# Patient Record
Sex: FEMALE | URBAN - METROPOLITAN AREA
[De-identification: names, ages, dates, MRNs, and addresses within clinical notes are randomized per-mention and may not be internally consistent; named-entity substitution may affect disease eponyms.]

---

## 2024-09-24 ENCOUNTER — E-VISIT (OUTPATIENT)
Dept: ADMINISTRATIVE | Facility: OTHER | Age: 44
End: 2024-09-24

## 2024-10-22 ENCOUNTER — E-VISIT (OUTPATIENT)
Dept: FAMILY MEDICINE CLINIC | Facility: TELEHEALTH | Age: 44
End: 2024-10-22
Payer: COMMERCIAL

## 2024-10-22 NOTE — E-VISIT TREATED
Date: 10/22/2024 19:42:55  Clinician: Veronica Sharp  Clinician NPI: [id number]  Patient: Siomara Duffy  Patient : 1980  Patient Address: [facility] [place] [address]  Patient Phone: [phone]  Visit Protocol: Ear pain  Patient Summary:  Siomara is a 44 year old ( : 1980 ) female who initiated a visit for swimmer's ear (outer ear infection).     Siomara reports that the ear pain started more than 7 days ago. The ear pain is located inside the left ear.   In   addition to the ear pain, Siomara is experiencing tenderness and a feeling of fullness in the ear(s). The ear(s) is tender to the touch on the ear canal.   Symptom Details   Pain: Siomara is experiencing moderate pain (4-6 on a 10 point pain scale). It   gets worse when Siomara gently pulls on the earlobe(s). It does not get worse when Siomara eats or chews.    Siomara denies ringing, change in color of the affected area, changes in hearing, itchiness, and blistering in the ear(s). Siomara also denies   recent injuries near the ear(s), the possibility of a foreign object in the ear(s), feeling feverish, having fluid draining from the ear(s), and ever having ear tubes.   Precipitating events   Siomara denies flying and swimming within the past week.     Pertinent medical history   Siomara denies having immunosuppressive conditions (e.g., chemotherapy, HIV, organ transplant, long-term use of steroids or other immunosuppressive medications, splenectomy).   Siomara does not have diabetes.   Siomara denies   pregnancy and denies breastfeeding.   Weight: 145 lbs (65.77 kg)    MEDICATIONS: Vitamin B-12 oral, Estroven Complete Menopause Relief oral, ALLERGIES: sulfamethoxazole  Clinician Response:  Dear Siomara,   Based upon the information you provided, you may have otitis externa. External otitis, also known as swimmer's ear, is a condition that occurs when the ear canal becomes irritated or infected.   Medication information    I  am prescribing:     Neomycin-polymyxin-hydrocortisone 1% otic ear drops. Instill 4 drops into affected ear(s) 3 times per day for 7 days. There are no refills with this prescription.   Instructions for using eardrops:     Lie on your side or tilt your head    Insert the drops into your ear canal    Lie on your side or insert a cotton ball in the ear canal for 5 minutes    Use the medication for the number of days recommended, even if you begin to feel better within a few days after starting the drops     Self care  Avoid getting water inside your ear while you are being treated for swimmer's ear.  Use a cotton ball coated with petroleum jelly to protect your ears when bathing and showering.  Do not go swimming or diving for the next 7-10 days.  Do not   wear headphones or hearing aid in the infected ears until your symptoms improve.  When to seek care  Please make an appointment to be seen in a clinic or urgent care if any of the following occur:     Symptoms do not improve within 2 days    New symptoms develop or symptoms becomes worse      Diagnosis: Otitis externa  Diagnosis ICD: H60.399    Follow up instructions: ATTENTION: If you have been prescribed medications, your prescriptions will not be sent until you choose your pharmacy.  To do so open the link within your notification, or go to [employer] and click eVisit in the menu to open your   treatment plan. From there, you can select your pharmacy at the bottom of your after visit summary. You can also go to [email or web address]  Prescriptions  Prescription: neomycin-polymyxin-HC 3.5-10,000-1 mg/mL-unit/mL-% otic (ear) drops,suspension, instill 4 drops into affected ear(s) 3 times per day for 7 days  Sent To: [id number] - [phone] - [address] [place] [address]

## 2024-11-11 ENCOUNTER — OFFICE VISIT (OUTPATIENT)
Dept: INTERNAL MEDICINE | Facility: CLINIC | Age: 44
End: 2024-11-11
Payer: COMMERCIAL

## 2024-11-11 ENCOUNTER — LAB (OUTPATIENT)
Dept: LAB | Facility: HOSPITAL | Age: 44
End: 2024-11-11
Payer: COMMERCIAL

## 2024-11-11 VITALS
OXYGEN SATURATION: 100 % | HEIGHT: 64 IN | BODY MASS INDEX: 28.92 KG/M2 | SYSTOLIC BLOOD PRESSURE: 118 MMHG | HEART RATE: 107 BPM | DIASTOLIC BLOOD PRESSURE: 68 MMHG | WEIGHT: 169.4 LBS

## 2024-11-11 DIAGNOSIS — F33.1 MODERATE EPISODE OF RECURRENT MAJOR DEPRESSIVE DISORDER: ICD-10-CM

## 2024-11-11 DIAGNOSIS — N94.6 PAINFUL MENSTRUATION: ICD-10-CM

## 2024-11-11 DIAGNOSIS — Z83.3 FAMILY HISTORY OF DIABETES MELLITUS (DM): ICD-10-CM

## 2024-11-11 DIAGNOSIS — Z76.89 ENCOUNTER TO ESTABLISH CARE: Primary | ICD-10-CM

## 2024-11-11 DIAGNOSIS — Z13.29 SCREENING FOR HYPOTHYROIDISM: ICD-10-CM

## 2024-11-11 DIAGNOSIS — R10.9 ABDOMINAL CRAMPING: ICD-10-CM

## 2024-11-11 DIAGNOSIS — Z13.0 SCREENING FOR IRON DEFICIENCY ANEMIA: ICD-10-CM

## 2024-11-11 DIAGNOSIS — Z13.21 ENCOUNTER FOR VITAMIN DEFICIENCY SCREENING: ICD-10-CM

## 2024-11-11 DIAGNOSIS — N92.1 MENORRHAGIA WITH IRREGULAR CYCLE: ICD-10-CM

## 2024-11-11 DIAGNOSIS — Z13.220 SCREENING FOR HYPERLIPIDEMIA: ICD-10-CM

## 2024-11-11 DIAGNOSIS — N92.6 MENSTRUAL ABNORMALITY: ICD-10-CM

## 2024-11-11 DIAGNOSIS — Z13.1 SCREENING FOR DIABETES MELLITUS (DM): ICD-10-CM

## 2024-11-11 DIAGNOSIS — Z01.419 WELL WOMAN EXAM: ICD-10-CM

## 2024-11-11 LAB
ALBUMIN SERPL-MCNC: 4.1 G/DL (ref 3.5–5.2)
ALBUMIN/GLOB SERPL: 1.6 G/DL
ALP SERPL-CCNC: 77 U/L (ref 39–117)
ALT SERPL W P-5'-P-CCNC: 7 U/L (ref 1–33)
ANION GAP SERPL CALCULATED.3IONS-SCNC: 10 MMOL/L (ref 5–15)
AST SERPL-CCNC: 9 U/L (ref 1–32)
BILIRUB BLD-MCNC: NEGATIVE MG/DL
BILIRUB SERPL-MCNC: 0.2 MG/DL (ref 0–1.2)
BUN SERPL-MCNC: 13 MG/DL (ref 6–20)
BUN/CREAT SERPL: 21.7 (ref 7–25)
CALCIUM SPEC-SCNC: 9.2 MG/DL (ref 8.6–10.5)
CHLORIDE SERPL-SCNC: 107 MMOL/L (ref 98–107)
CHOLEST SERPL-MCNC: 121 MG/DL (ref 0–200)
CLARITY, POC: ABNORMAL
CO2 SERPL-SCNC: 22 MMOL/L (ref 22–29)
COLOR UR: ABNORMAL
CREAT SERPL-MCNC: 0.6 MG/DL (ref 0.57–1)
DEPRECATED RDW RBC AUTO: 43.5 FL (ref 37–54)
EGFRCR SERPLBLD CKD-EPI 2021: 113.7 ML/MIN/1.73
ERYTHROCYTE [DISTWIDTH] IN BLOOD BY AUTOMATED COUNT: 20.2 % (ref 12.3–15.4)
EXPIRATION DATE: ABNORMAL
FERRITIN SERPL-MCNC: 2.63 NG/ML (ref 13–150)
GLOBULIN UR ELPH-MCNC: 2.6 GM/DL
GLUCOSE SERPL-MCNC: 85 MG/DL (ref 65–99)
GLUCOSE UR STRIP-MCNC: NEGATIVE MG/DL
HCT VFR BLD AUTO: 18.3 % (ref 34–46.6)
HDLC SERPL-MCNC: 41 MG/DL (ref 40–60)
HGB BLD-MCNC: 4.5 G/DL (ref 12–15.9)
IRON 24H UR-MRATE: 10 MCG/DL (ref 37–145)
IRON SATN MFR SERPL: 2 % (ref 20–50)
KETONES UR QL: NEGATIVE
LDLC SERPL CALC-MCNC: 61 MG/DL (ref 0–100)
LDLC/HDLC SERPL: 1.45 {RATIO}
LEUKOCYTE EST, POC: NEGATIVE
Lab: ABNORMAL
MCH RBC QN AUTO: 15.7 PG (ref 26.6–33)
MCHC RBC AUTO-ENTMCNC: 24.6 G/DL (ref 31.5–35.7)
MCV RBC AUTO: 63.8 FL (ref 79–97)
NITRITE UR-MCNC: NEGATIVE MG/ML
PH UR: 6 [PH] (ref 5–8)
PLATELET # BLD AUTO: 293 10*3/MM3 (ref 140–450)
POTASSIUM SERPL-SCNC: 4.4 MMOL/L (ref 3.5–5.2)
PROT SERPL-MCNC: 6.7 G/DL (ref 6–8.5)
PROT UR STRIP-MCNC: NEGATIVE MG/DL
RBC # BLD AUTO: 2.87 10*6/MM3 (ref 3.77–5.28)
RBC # UR STRIP: ABNORMAL /UL
SODIUM SERPL-SCNC: 139 MMOL/L (ref 136–145)
SP GR UR: 1.01 (ref 1–1.03)
TIBC SERPL-MCNC: 483 MCG/DL (ref 298–536)
TRANSFERRIN SERPL-MCNC: 324 MG/DL (ref 200–360)
TRIGL SERPL-MCNC: 103 MG/DL (ref 0–150)
TSH SERPL DL<=0.05 MIU/L-ACNC: 1.58 UIU/ML (ref 0.27–4.2)
UROBILINOGEN UR QL: ABNORMAL
VLDLC SERPL-MCNC: 19 MG/DL (ref 5–40)
WBC NRBC COR # BLD AUTO: 5.87 10*3/MM3 (ref 3.4–10.8)

## 2024-11-11 PROCEDURE — 80061 LIPID PANEL: CPT | Performed by: NURSE PRACTITIONER

## 2024-11-11 PROCEDURE — 80053 COMPREHEN METABOLIC PANEL: CPT | Performed by: NURSE PRACTITIONER

## 2024-11-11 PROCEDURE — 82306 VITAMIN D 25 HYDROXY: CPT | Performed by: NURSE PRACTITIONER

## 2024-11-11 PROCEDURE — 85027 COMPLETE CBC AUTOMATED: CPT | Performed by: NURSE PRACTITIONER

## 2024-11-11 PROCEDURE — 83036 HEMOGLOBIN GLYCOSYLATED A1C: CPT | Performed by: NURSE PRACTITIONER

## 2024-11-11 PROCEDURE — 83540 ASSAY OF IRON: CPT | Performed by: NURSE PRACTITIONER

## 2024-11-11 PROCEDURE — 82607 VITAMIN B-12: CPT | Performed by: NURSE PRACTITIONER

## 2024-11-11 PROCEDURE — 84466 ASSAY OF TRANSFERRIN: CPT | Performed by: NURSE PRACTITIONER

## 2024-11-11 PROCEDURE — 99214 OFFICE O/P EST MOD 30 MIN: CPT | Performed by: NURSE PRACTITIONER

## 2024-11-11 PROCEDURE — 82728 ASSAY OF FERRITIN: CPT | Performed by: NURSE PRACTITIONER

## 2024-11-11 PROCEDURE — 36415 COLL VENOUS BLD VENIPUNCTURE: CPT | Performed by: NURSE PRACTITIONER

## 2024-11-11 PROCEDURE — 84443 ASSAY THYROID STIM HORMONE: CPT | Performed by: NURSE PRACTITIONER

## 2024-11-11 PROCEDURE — 81003 URINALYSIS AUTO W/O SCOPE: CPT | Performed by: NURSE PRACTITIONER

## 2024-11-11 RX ORDER — LANOLIN ALCOHOL/MO/W.PET/CERES
2500 CREAM (GRAM) TOPICAL DAILY
COMMUNITY

## 2024-11-11 NOTE — PROGRESS NOTES
Office Note     Name: Siomara Duffy    : 1980     MRN: [id number]     Chief Complaint  Establish Care, Labs Only (Patient reports today to establish care and get labs. Patient states she thinks she may be anemic. ), Menopause (Patient reports today because she thinks she is going into menopause. Patient states her mood swings are all over the place, she has excessive bleeding and has severe abdominal cramps. Patient states she would like to discuss where she is at stage wise during menopause. Patent states she has had increased symptoms for around 1 year and her period hasn't stopped in over 20 days. ), and Dizziness (Patient reports today to discuss dizziness. Patient states that she has had dizziness going on for around 6 months and thinks it may be caused because on menopause and anemia from bleeding so much consistently. )    Subjective     History of Present Illness:  Siomara Duffy is a 44 y.o. female who presents today to establish care with a new provider.  Patient has not had a primary care provider since prior to COVID.  Past medical and medication history reviewed with the patient.    Family history consist of maternal grandmother with rheumatoid arthritis and paternal grandmother with diabetes.    Patient reports she has been dealing with emotional and hormonal changes since the start of  when she turned 40.  She is concerned that she may be dealing with some menopausal symptoms.  She has noticed that her menstrual cycle is more regular recently.  Her cycle is occurring more frequently with a much heavier flow.  She reports during her menstrual cycle she has to be homebound for about 4 days of the cycle due to excessive and heavy bleeding.  She will have to change her pads hourly.  She is also noted passing multiple blood clots.  She has been experiencing some abdominal pain which she feels like has been worse over the past month the pain is present to her low abdomen bilaterally and  suprapubic area.  She is also noted a vaginal discharge.      She is normally able to take ibuprofen and Pamprin as needed to assist with symptoms.  She does not feel this has been helpful recently.  She has also noted some swelling in her abdomen.  She reports the pain is severe at times and causes nausea.  She has also noted some occasional ankle edema.  She has been working on drinking more water recently and eating less sugar.  She feels her ankle edema has been improved with doing so.      She previously saw a gynecologist back in 2018 or 2019.  She was experiencing the abdominal pain at that time.  She tried to discuss with her provider at that time but did not feel heard by that provider.  She never returned for follow-up.  Her Pap smear at that time was normal.  She denies a history of abnormal Pap smear.  She has no known ovarian cyst or uterine fibroids.  She is concerned she may have endometriosis.  She does feel the emotional and hormonal shifts have been worse recently.  She has been taking an over-the-counter Estroven daily supplement.  She would like to have labs done and discuss further evaluation of the symptoms.    She has a history of depression.  She does feel her depression symptoms have been worsening with her menopausal symptoms.  She does not currently take any medication for her depression.  She would like to have labs checked first prior to discussing medication.    She is  and has been with her significant other for 22 years.  She has 1 daughter.  She works from home.  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    She denies any tobacco use.  She denies any recreational drug use.  She does have about 2 glasses of wine weekly.    No further complaints or concerns at this time.  Patient presents today to establish care with a new provider and to address the above concerns.  Pleasant visit with the patient today.        History reviewed. No pertinent past medical  "history.    History reviewed. No pertinent surgical history.    Social History     Socioeconomic History    Marital status:    Tobacco Use    Smoking status: Never    Smokeless tobacco: Never   Vaping Use    Vaping status: Never Used   Substance and Sexual Activity    Alcohol use: Yes     Alcohol/week: 2.0 standard drinks of alcohol     Types: 2 Glasses of wine per week    Drug use: Never    Sexual activity: Not Currently         Current Outpatient Medications:     vitamin B-12 (CYANOCOBALAMIN) 1000 MCG tablet, Take 2.5 tablets by mouth Daily., Disp: , Rfl:     Objective     Vital Signs  /68 (BP Location: Left arm, Patient Position: Sitting, Cuff Size: Adult)   Pulse 107   Ht 162.6 cm (64\")   Wt 76.8 kg (169 lb 6.4 oz)   SpO2 100%   BMI 29.08 kg/m²   Estimated body mass index is 29.08 kg/m² as calculated from the following:    Height as of this encounter: 162.6 cm (64\").    Weight as of this encounter: 76.8 kg (169 lb 6.4 oz).    BMI is >= 25 and <30. (Overweight) The following options were offered after discussion;: Not addressed at this visit.      Physical Exam  Constitutional:       General: She is not in acute distress.     Appearance: Normal appearance. She is not ill-appearing.   HENT:      Head: Normocephalic and atraumatic.      Nose: Nose normal.   Eyes:      Extraocular Movements: Extraocular movements intact.      Conjunctiva/sclera: Conjunctivae normal.      Pupils: Pupils are equal, round, and reactive to light.      Comments: Glasses in place   Cardiovascular:      Rate and Rhythm: Normal rate.   Pulmonary:      Effort: Pulmonary effort is normal. No respiratory distress.   Musculoskeletal:         General: Normal range of motion.      Cervical back: Neck supple.   Skin:     General: Skin is warm and dry.      Coloration: Skin is pale.   Neurological:      General: No focal deficit present.      Mental Status: She is alert and oriented to person, place, and time. Mental status is at " baseline.   Psychiatric:         Mood and Affect: Mood normal.         Behavior: Behavior normal.         Thought Content: Thought content normal.         Judgment: Judgment normal.          Assessment and Plan     Diagnoses and all orders for this visit:    1. Encounter to establish care (Primary)    2. Moderate episode of recurrent major depressive disorder    3. Menstrual abnormality  -     Ambulatory Referral to Gynecology  -     POCT urinalysis dipstick, automated    4. Family history of diabetes mellitus (DM)  -     Comprehensive metabolic panel  -     Hemoglobin A1c    5. Screening for hypothyroidism  -     TSH Rfx On Abnormal To Free T4    6. Screening for diabetes mellitus (DM)  -     Comprehensive metabolic panel  -     Urinalysis With Culture If Indicated -  -     Hemoglobin A1c    7. Encounter for vitamin deficiency screening  -     Vitamin D,25-Hydroxy  -     Vitamin B12    8. Screening for iron deficiency anemia  -     CBC (No Diff)  -     Iron Profile  -     Ferritin    9. Screening for hyperlipidemia  -     Lipid Panel    10. Menorrhagia with irregular cycle  -     CBC (No Diff)  -     Iron Profile  -     Ferritin  -     Ambulatory Referral to Gynecology  -     POCT urinalysis dipstick, automated    11. Painful menstruation  -     Ambulatory Referral to Gynecology  -     POCT urinalysis dipstick, automated    12. Abdominal cramping  -     POCT urinalysis dipstick, automated    13. Well woman exam  -     Ambulatory Referral to Gynecology    Plan:  Establish care visit completed today.  Orders placed for labs.  Concern for significant anemia as patient has had ongoing heavy menses and abnormal menstrual cycles for the past 4 years.  Will also check iron levels.  Will review lab results with patient once received and reviewed.  Further plan of care based on lab result findings.  Urgent referral placed to gynecology for evaluation of symptoms.  UA in the office today with 2+ blood.  Patient is on her  menstrual cycle.  Otherwise, ELDA fraire.  Will await lab results.  Plan for follow-up in about 4 weeks for annual physical exam.  Return to clinic sooner if needed.      Follow Up  Return for Annual.    ELDER Martinez    Part of this note may be an electronic transcription/translation of spoken language to printed text using the Dragon Dictation System.

## 2024-11-12 ENCOUNTER — TELEPHONE (OUTPATIENT)
Dept: INTERNAL MEDICINE | Facility: CLINIC | Age: 44
End: 2024-11-12
Payer: COMMERCIAL

## 2024-11-12 LAB
25(OH)D3 SERPL-MCNC: 22.6 NG/ML (ref 30–100)
HBA1C MFR BLD: 7.8 % (ref 4.8–5.6)
VIT B12 BLD-MCNC: >2000 PG/ML (ref 211–946)

## 2024-11-12 NOTE — TELEPHONE ENCOUNTER
Pt notified that she needs to go to ER per Dr Day and patient declines to go to ER for eval. She does not feel comfortable with this. She states that she is only like this because her period has been heavy and ongoing and her bleeding is not as bad today so she is starting to feel better. She states that she has had heart palps and dizziness but she is not having those today. She wants to see if there is something she can try at home to get her hemoglobin up and she will do that. Advised that I will pass this message to Ivis and Dr. Day and get back to her if they have other recommendations.

## 2024-11-12 NOTE — TELEPHONE ENCOUNTER
Message left for patient to call office to follow up on critical lab results.  Per Dr. Day patient needs to be advised to go to ED for further evaluation.

## 2024-11-12 NOTE — TELEPHONE ENCOUNTER
Left Vm at [phone] and [phone] because both of those numbers are listed as hers and one of them could be a typo. Pt has a critical lab that needs to be addressed. I also left a VM with her daughter who is listed as alternate .

## 2024-11-12 NOTE — TELEPHONE ENCOUNTER
Called and left VM for pt to return call to office. Also called and left VM for daughter to return call to office.

## 2024-11-15 ENCOUNTER — PATIENT ROUNDING (BHMG ONLY) (OUTPATIENT)
Dept: INTERNAL MEDICINE | Facility: CLINIC | Age: 44
End: 2024-11-15
Payer: COMMERCIAL

## 2024-11-15 NOTE — PROGRESS NOTES
A My-chart message has been sent to the patient for Patient Rounding with [facility].

## 2024-11-27 DIAGNOSIS — N94.6 DYSMENORRHEA: ICD-10-CM

## 2024-11-27 DIAGNOSIS — N92.1 MENORRHAGIA WITH IRREGULAR CYCLE: Primary | ICD-10-CM

## 2024-12-02 ENCOUNTER — OFFICE VISIT (OUTPATIENT)
Dept: OBSTETRICS AND GYNECOLOGY | Facility: CLINIC | Age: 44
End: 2024-12-02
Payer: COMMERCIAL

## 2024-12-02 VITALS — BODY MASS INDEX: 39.34 KG/M2 | HEIGHT: 55 IN | WEIGHT: 170 LBS

## 2024-12-02 DIAGNOSIS — N93.9 ABNORMAL UTERINE BLEEDING (AUB): Primary | ICD-10-CM

## 2024-12-02 DIAGNOSIS — Z01.419 ROUTINE GYNECOLOGICAL EXAMINATION: ICD-10-CM

## 2024-12-02 DIAGNOSIS — Z12.11 COLON CANCER SCREENING: ICD-10-CM

## 2024-12-02 DIAGNOSIS — D50.0 IRON DEFICIENCY ANEMIA DUE TO CHRONIC BLOOD LOSS: ICD-10-CM

## 2024-12-02 DIAGNOSIS — N94.89 ADNEXAL MASS: ICD-10-CM

## 2024-12-02 DIAGNOSIS — Z12.31 ENCOUNTER FOR SCREENING MAMMOGRAM FOR MALIGNANT NEOPLASM OF BREAST: ICD-10-CM

## 2024-12-02 LAB
ALBUMIN SERPL-MCNC: 4.2 G/DL (ref 3.5–5.2)
ALBUMIN/GLOB SERPL: 1.4 G/DL
ALP SERPL-CCNC: 82 U/L (ref 39–117)
ALT SERPL-CCNC: 6 U/L (ref 1–33)
AST SERPL-CCNC: 12 U/L (ref 1–32)
B-HCG UR QL: NEGATIVE
BASOPHILS # BLD AUTO: ABNORMAL 10*3/UL
BASOPHILS # BLD MANUAL: 0.1 10*3/MM3 (ref 0–0.2)
BASOPHILS NFR BLD MANUAL: 2.2 % (ref 0–1.5)
BILIRUB SERPL-MCNC: 0.3 MG/DL (ref 0–1.2)
BUN SERPL-MCNC: 11 MG/DL (ref 6–20)
BUN/CREAT SERPL: 16.7 (ref 7–25)
CALCIUM SERPL-MCNC: 9.5 MG/DL (ref 8.6–10.5)
CHLORIDE SERPL-SCNC: 104 MMOL/L (ref 98–107)
CO2 SERPL-SCNC: 20.9 MMOL/L (ref 22–29)
CREAT SERPL-MCNC: 0.66 MG/DL (ref 0.57–1)
DIFFERENTIAL COMMENT: ABNORMAL
EGFRCR SERPLBLD CKD-EPI 2021: 111.1 ML/MIN/1.73
EOSINOPHIL # BLD AUTO: ABNORMAL 10*3/UL
EOSINOPHIL NFR BLD AUTO: ABNORMAL %
ERYTHROCYTE [DISTWIDTH] IN BLOOD BY AUTOMATED COUNT: 20.1 % (ref 12.3–15.4)
EXPIRATION DATE: NORMAL
GLOBULIN SER CALC-MCNC: 2.9 GM/DL
GLUCOSE SERPL-MCNC: 97 MG/DL (ref 65–99)
HCT VFR BLD AUTO: 19.1 % (ref 34–46.6)
HGB BLD-MCNC: 4.7 G/DL (ref 12–15.9)
INTERNAL NEGATIVE CONTROL: NORMAL
INTERNAL POSITIVE CONTROL: NORMAL
IRON SATN MFR SERPL: 3 % (ref 20–50)
IRON SERPL-MCNC: 16 MCG/DL (ref 37–145)
LYMPHOCYTES # BLD AUTO: ABNORMAL 10*3/UL
LYMPHOCYTES # BLD MANUAL: 1.34 10*3/MM3 (ref 0.7–3.1)
LYMPHOCYTES NFR BLD AUTO: ABNORMAL %
LYMPHOCYTES NFR BLD MANUAL: 30.4 % (ref 19.6–45.3)
Lab: NORMAL
MCH RBC QN AUTO: 15.5 PG (ref 26.6–33)
MCHC RBC AUTO-ENTMCNC: 24.6 G/DL (ref 31.5–35.7)
MCV RBC AUTO: 62.8 FL (ref 79–97)
MONOCYTES # BLD MANUAL: 0.33 10*3/MM3 (ref 0.1–0.9)
MONOCYTES NFR BLD AUTO: ABNORMAL %
MONOCYTES NFR BLD MANUAL: 7.6 % (ref 5–12)
NEUTROPHILS # BLD MANUAL: 2.63 10*3/MM3 (ref 1.7–7)
NEUTROPHILS NFR BLD AUTO: ABNORMAL %
NEUTROPHILS NFR BLD MANUAL: 59.8 % (ref 42.7–76)
PLATELET # BLD AUTO: 326 10*3/MM3 (ref 140–450)
PLATELET BLD QL SMEAR: ABNORMAL
POTASSIUM SERPL-SCNC: 4.9 MMOL/L (ref 3.5–5.2)
PROT SERPL-MCNC: 7.1 G/DL (ref 6–8.5)
RBC # BLD AUTO: 3.04 10*6/MM3 (ref 3.77–5.28)
RBC MORPH BLD: ABNORMAL
SODIUM SERPL-SCNC: 136 MMOL/L (ref 136–145)
TIBC SERPL-MCNC: 551 MCG/DL
UIBC SERPL-MCNC: 535 MCG/DL (ref 112–346)
WBC # BLD AUTO: 4.4 10*3/MM3 (ref 3.4–10.8)

## 2024-12-02 PROCEDURE — 99213 OFFICE O/P EST LOW 20 MIN: CPT | Performed by: OBSTETRICS & GYNECOLOGY

## 2024-12-02 PROCEDURE — 99386 PREV VISIT NEW AGE 40-64: CPT | Performed by: OBSTETRICS & GYNECOLOGY

## 2024-12-02 PROCEDURE — 81025 URINE PREGNANCY TEST: CPT | Performed by: OBSTETRICS & GYNECOLOGY

## 2024-12-02 RX ORDER — TRANEXAMIC ACID 650 MG/1
TABLET ORAL
Qty: 30 TABLET | Refills: 12 | Status: SHIPPED | OUTPATIENT
Start: 2024-12-02

## 2024-12-02 NOTE — PROGRESS NOTES
Gynecologic Annual Exam Note          GYN Annual Exam     Gynecologic Exam (New patient) and Fibroids        Subjective     HPI  Siomara Duffy is a 44 y.o. female, , who presents for annual well woman exam as a new patient. There were no changes to her medical or surgical history since her last visit..  Patient's last menstrual period was 10/23/2024 (approximate).   Lmp is very irregular  The flow is heavy. She reports dysmenorrhea is severe occurring first 1-2 days of flow.     In discussing severe anemia, patient states that she has studied Chinese medicine and likes to do things as natural as possible.  But she has reached a point that she's ok with an IUD or whatever will work.    Marital Status: . She is sexually active. She has not had new partners.. STD testing recommendations have been explained to the patient and she declines STD testing.    Dysmenorrhea.  More pain the past couple months.    The patient would like to discuss the following complaints today: menorrhagia, dysmenorrhea, anemia, and fatigue.    Additional OB/GYN History   contraceptive methods: None  Desires to:  wants to discuss with provider re:  contraception  History of migraines: no    Last Pap : 2018. Result: negative. HPV:  unknown .   Last Completed Pap Smear       This patient has no relevant Health Maintenance data.          History of abnormal Pap smear: no  Family history of uterine, colon, breast, or ovarian cancer: no  Performs monthly Self-Breast Exam: yes  Last mammogram: 2018. Done at not sure. There is not a copy in the chart.    Last Completed Mammogram       This patient has no relevant Health Maintenance data.            Colonoscopy: has never had a colonoscopy or cologuard  Exercises Regularly: yes  Feelings of Anxiety or Depression: no  Tobacco Usage?: No       Current Outpatient Medications:     Specialty Vitamins Products (MENOPAUSE SUPPORT PO), Take  by mouth., Disp: , Rfl:     Tranexamic Acid  "(Lysteda) 650 MG tablet, 2 tablets by mouth 3 times daily for 5 days, Disp: 30 tablet, Rfl: 12    vitamin B-12 (CYANOCOBALAMIN) 1000 MCG tablet, Take 2.5 tablets by mouth Daily., Disp: , Rfl:      Patient denies the need for medication refills today.    OB History          2    Para   1    Term   1            AB   1    Living   1         SAB        IAB        Ectopic        Molar        Multiple        Live Births   1                Past Medical History:   Diagnosis Date    Anemia         Past Surgical History:   Procedure Laterality Date    WISDOM TOOTH EXTRACTION         Health Maintenance   Topic Date Due    COVID-19 Vaccine ( season) 2024    ANNUAL PHYSICAL  2024 (Originally 2024)    PAP SMEAR  2025 (Originally 2024)    MAMMOGRAM  01/15/2025 (Originally 2020)    INFLUENZA VACCINE  2025 (Originally 2024)    HEPATITIS C SCREENING  2025 (Originally 2024)    TDAP/TD VACCINES (1 - Tdap) 2025 (Originally 1999)    BMI FOLLOWUP  2025    Annual Gynecologic Pelvic and Breast Exam  2025    Pneumococcal Vaccine 0-64  Aged Out       The additional following portions of the patient's history were reviewed and updated as appropriate: allergies, current medications, past family history, past medical history, past social history, past surgical history, and problem list.    Review of Systems      I have reviewed and agree with the HPI, ROS, and historical information as entered above. Halina Martinez MD          Objective   Ht 63.2 cm (24.9\")   Wt 77.1 kg (170 lb)   LMP 10/23/2024 (Approximate)   .75 kg/m²     Physical Exam  General:  well developed; well nourished  no acute distress  Skin:  No suspicious lesions seen  Thyroid: normal to inspection and palpation  Breasts:  Examined in supine position  Symmetric without masses or skin dimpling  Nipples normal without inversion, lesions or discharge  There are no " palpable axillary nodes  CVS: RRR, no M/R/G, distal pulses wnl  Resp: CTAB, No W/R/R  Abdomen: soft, non-tender; no masses  no umbilical or inguinal hernias are present  no hepato-splenomegaly  Pelvis: Clinical staff was present for exam  External genitalia:  normal appearance of the external genitalia including Bartholin's and Tinsman's glands.  Urethra: no masses or tenderness  Urethral meatus: normal size;  No lesions or signs of prolapse  Bladder: non tender to palpation, no masses, no prolapse  Vagina:  normal pink mucosa without prolapse or lesions.  Cervix:  normal appearance.  Uterus:  enlarged, tender, mass palpable in right pelvis below umbilicus, somewhat mobile.  Adnexa:  normal bimanual exam of the adnexa.  Perineum/Anus: normal appearance, no external hemorrhoids  Ext: 2+ pulses, no edema     Assessment and Plan    Problem List Items Addressed This Visit       Routine gynecological examination    Relevant Orders    Mammo Screening Digital Tomosynthesis Bilateral With CAD    LIQUID-BASED PAP SMEAR WITH HPV GENOTYPING REGARDLESS OF INTERPRETATION ([facility])    TISSUE EXAM, [facility] ([facility])    Abnormal uterine bleeding (AUB) - Primary    Relevant Orders    CBC & Differential (Completed)    Comprehensive Metabolic Panel (Completed)    Iron Profile (Completed)    Ambulatory Referral For Screening Colonoscopy    POC Pregnancy, Urine (Completed)    LIQUID-BASED PAP SMEAR WITH HPV GENOTYPING REGARDLESS OF INTERPRETATION ([facility])    TISSUE EXAM, [facility] ([facility])    Adnexal mass    Relevant Orders    OVA1 ([employer])    Iron deficiency anemia due to chronic blood loss    Relevant Medications    vitamin B-12 (CYANOCOBALAMIN) 1000 MCG tablet    Other Relevant Orders    Ambulatory Referral For Screening Colonoscopy    Encounter for screening mammogram for malignant neoplasm of breast    Relevant Orders    Ambulatory Referral For Screening Colonoscopy    Colon cancer screening    Relevant  Orders    Ambulatory Referral For Screening Colonoscopy       GYN annual well woman exam.   Pap guidelines reviewed.  Reviewed monthly self breast exams.  Instructed to call with lumps, pain, or breast discharge.    Ordered Mammogram today  Reviewed exercise as a preventative health measures.   Discussed severe anemia.  Discussed I would definitely recommend below Hgb 7.  She really does not want one and thinks level likely better b/c not bleeding and she feels a little better.  We discussed if not severely low again, would at least do iron transfusions.  Discussed we will need this prior to surgery.  Reviewed u/s findings - intracavitary mass within endometrium, likely fibroid and right adnexal mass which is likely ovary, possibly a dermoid.  Will do OVA 1.  Discussed option for hyst, RSO, LS vs. She wants to be as conservative as possible and we could do Lap RSO, hysteroscopy, D&C, myosure procedure.  Discussed with the size of fibroid, this may need to be done in multiple attempts as the likelihood of reaching the fluid defidit limit is greater with larger fibroids to remove.  Return in about 2 weeks (around 12/16/2024) for Next scheduled follow up.  And results/planning  Encouraged at least Lysteda if she starts bleeding again prior to f/u appt.  Discussed if this is not enough she should call for progesterone.    Halina Martinez MD  12/02/2024

## 2024-12-03 LAB — REF LAB TEST METHOD: NORMAL

## 2024-12-03 NOTE — PROGRESS NOTES
This level is still critically low.  I know that she was trying to avoid transfusion, but it is at the level that she could have heart failure from the anemia.  Also, it is too dangerous to do a procedure at this level.  Please, please come in for admission and ideally blood transfusion.  Let me know when she can come so I can call bed command please.

## 2024-12-04 ENCOUNTER — TELEPHONE (OUTPATIENT)
Dept: OBSTETRICS AND GYNECOLOGY | Facility: CLINIC | Age: 44
End: 2024-12-04
Payer: COMMERCIAL

## 2024-12-04 NOTE — TELEPHONE ENCOUNTER
Dr. Martinez called from the OR this afternoon to follow up on the time line of you being able to get an outpatient iron infusion at [facility] (located on the first floor of the [facility]).  Typically they can add you to the schedule after a week, that gives them time to get the auth for the medication approved. Dr. Martinez thought that was longer than she had hoped for. She wanted you to be updated this evening. Message sent to the patient via [employer]

## 2024-12-05 DIAGNOSIS — N92.0 MENORRHAGIA WITH REGULAR CYCLE: ICD-10-CM

## 2024-12-05 DIAGNOSIS — D62 ANEMIA DUE TO ACUTE BLOOD LOSS: Primary | ICD-10-CM

## 2024-12-05 RX ORDER — SODIUM CHLORIDE 9 MG/ML
20 INJECTION, SOLUTION INTRAVENOUS ONCE
OUTPATIENT
Start: 2024-12-09

## 2024-12-05 RX ORDER — FAMOTIDINE 10 MG/ML
20 INJECTION, SOLUTION INTRAVENOUS AS NEEDED
OUTPATIENT
Start: 2024-12-09

## 2024-12-05 RX ORDER — DIPHENHYDRAMINE HYDROCHLORIDE 50 MG/ML
50 INJECTION INTRAMUSCULAR; INTRAVENOUS AS NEEDED
OUTPATIENT
Start: 2024-12-09

## 2024-12-05 RX ORDER — HYDROCORTISONE SODIUM SUCCINATE 100 MG/2ML
100 INJECTION INTRAMUSCULAR; INTRAVENOUS AS NEEDED
OUTPATIENT
Start: 2024-12-09

## 2024-12-09 ENCOUNTER — TELEPHONE (OUTPATIENT)
Dept: OBSTETRICS AND GYNECOLOGY | Facility: CLINIC | Age: 44
End: 2024-12-09
Payer: COMMERCIAL

## 2024-12-09 NOTE — TELEPHONE ENCOUNTER
LAB CALLING TO GET DIAGNOSIS CODE FOR PATIENT OVAWATCH   LAB DIRECT LINE [phone] AND SHE SAID IT'S OK TO LVM

## 2024-12-09 NOTE — TELEPHONE ENCOUNTER
Lvom with lab that diagnosis code for this is: R19.00 and if they had any other questions to CB    Lab Phone # : [phone]

## 2025-01-08 ENCOUNTER — OFFICE VISIT (OUTPATIENT)
Dept: OBSTETRICS AND GYNECOLOGY | Facility: CLINIC | Age: 45
End: 2025-01-08
Payer: COMMERCIAL

## 2025-01-08 VITALS
WEIGHT: 171 LBS | SYSTOLIC BLOOD PRESSURE: 180 MMHG | DIASTOLIC BLOOD PRESSURE: 80 MMHG | HEIGHT: 63 IN | BODY MASS INDEX: 30.3 KG/M2

## 2025-01-08 DIAGNOSIS — D50.0 IRON DEFICIENCY ANEMIA DUE TO CHRONIC BLOOD LOSS: ICD-10-CM

## 2025-01-08 DIAGNOSIS — N93.9 ABNORMAL UTERINE BLEEDING (AUB): Primary | ICD-10-CM

## 2025-01-08 DIAGNOSIS — N94.89 ENDOMETRIAL MASS: ICD-10-CM

## 2025-01-08 DIAGNOSIS — N94.89 ADNEXAL MASS: ICD-10-CM

## 2025-01-08 LAB
IRON SATN MFR SERPL: 55 % (ref 20–50)
IRON SERPL-MCNC: 254 MCG/DL (ref 37–145)
TIBC SERPL-MCNC: 459 MCG/DL
UIBC SERPL-MCNC: 205 MCG/DL (ref 112–346)

## 2025-01-08 NOTE — PROGRESS NOTES
"            Chief Complaint   Patient presents with    Follow-up       Subjective   HPI  Siomara Duffy is a 44 y.o. female, . Her last LMP was Patient's last menstrual period was 2025 (exact date).. who presents for follow up on menorrhagia, severe anemia. When asked if she thought anymore about iron transfusions she refused. Her period started Monday and was heavy, but lightened after starting Lysteda. Her BP today was very high at 180/80.     At her last visit she was treated with medication management with Lysteda .       Additional OB/GYN History     Last Pap : 24 negative -HPV  Last Completed Pap Smear            PAP SMEAR (Every 3 Years) Next due on 2024  LIQUID-BASED PAP SMEAR WITH HPV GENOTYPING REGARDLESS OF INTERPRETATION (MARCELO,COR,MAD)                    Last mammogram: none ordered at her last visit  Last Completed Mammogram       This patient has no relevant Health Maintenance data.            Tobacco Usage?: No   OB History          2    Para   1    Term   1            AB   1    Living   1         SAB        IAB        Ectopic        Molar        Multiple        Live Births   1                  Current Outpatient Medications:     Specialty Vitamins Products (MENOPAUSE SUPPORT PO), Take  by mouth., Disp: , Rfl:     Tranexamic Acid (Lysteda) 650 MG tablet, 2 tablets by mouth 3 times daily for 5 days, Disp: 30 tablet, Rfl: 12     Past Medical History:   Diagnosis Date    Anemia         Past Surgical History:   Procedure Laterality Date    WISDOM TOOTH EXTRACTION         The additional following portions of the patient's history were reviewed and updated as appropriate: allergies.    Review of Systems   All other systems reviewed and are negative.      I have reviewed and agree with the HPI, ROS, and historical information as entered above. Halina Martinez MD      Objective   /80   Ht 160 cm (63\")   Wt 77.6 kg (171 lb)   LMP 2025 " (Exact Date)   BMI 30.29 kg/m²     Physical Exam  Physical Exam:  General:  well developed; well nourished  no acute distress  mentation appropriate   Abdomen: soft, non-tender; no masses  no umbilical or inguinal hernias are present   Pelvis: Not performed.      Assessment & Plan     Assessment     Problem List Items Addressed This Visit          THERAPY PLAN 1 - INFUSION TREATMENT    Abnormal uterine bleeding (AUB) - Primary    Iron deficiency anemia due to chronic blood loss    Relevant Orders    Iron Profile       Other    Adnexal mass    Endometrial mass           Plan     Repeat labs today.  Hopefully much better.  Discussed risks/benefits of Myosure myomectomy and lap USO vs. Cystectomy vs. TLH/RSO.  Reviewed that with MyoSure myomectomy, it would be uterus sparing that could require multiple settings and that if we got to the limits for the fluid installation, we would have to stop and come back for surgery again another day for safety reasons.  She is carefully considering risk benefit with the uterus sparing surgery as well as hysterectomy which might be faster and more straightforward.  Regardless, I do think robotic right salpingo-oophorectomy would be the easiest way to accomplish her surgery with the MyoSure.  I do not think I would be able to do a cystectomy and leave any good ovary based on the images  Return in about 2 weeks (around 1/22/2025) for WITH SONO.        Halina Martinez MD  01/08/2025

## 2025-01-15 ENCOUNTER — LAB (OUTPATIENT)
Dept: OBSTETRICS AND GYNECOLOGY | Facility: CLINIC | Age: 45
End: 2025-01-15
Payer: COMMERCIAL

## 2025-01-16 ENCOUNTER — TELEPHONE (OUTPATIENT)
Dept: OBSTETRICS AND GYNECOLOGY | Facility: CLINIC | Age: 45
End: 2025-01-16

## 2025-01-16 DIAGNOSIS — N93.9 ABNORMAL UTERINE BLEEDING (AUB): ICD-10-CM

## 2025-01-16 DIAGNOSIS — N94.89 ENDOMETRIAL MASS: ICD-10-CM

## 2025-01-16 DIAGNOSIS — N94.89 ADNEXAL MASS: ICD-10-CM

## 2025-01-16 DIAGNOSIS — N94.89 ENDOMETRIAL MASS: Primary | ICD-10-CM

## 2025-01-16 DIAGNOSIS — D62 ANEMIA DUE TO ACUTE BLOOD LOSS: Primary | ICD-10-CM

## 2025-01-16 DIAGNOSIS — D50.0 IRON DEFICIENCY ANEMIA DUE TO CHRONIC BLOOD LOSS: ICD-10-CM

## 2025-01-16 NOTE — TELEPHONE ENCOUNTER
Caller: Siomara Duffy    Relationship to patient: Self    Best call back number: There are no phone numbers on file.    Chief complaint: FIBROIDS    Type of visit: SURGERY    Requested date: 4/07/2025, 4/14/2025, OR 4/18/2025 MORNING PREFERRED    Additional notes: WOULD LIKE TO SCHEDULE SURGERY TO HAVE FIBROIDS REMOVED.

## 2025-01-16 NOTE — TELEPHONE ENCOUNTER
Caller: Siomara Duffy    Relationship: Self    Best call back number: [phone]    What is the best time to reach you: ASAP    Do you know the name of the person who called: DR NIELSEN    What was the call regarding: MISSED CALL AND WANTS TO TALK ABOUT SETTING UP SURGERY     Is it okay if the provider responds through MyChart: CALL BACK

## 2025-02-21 DIAGNOSIS — N94.89 ADNEXAL MASS: Primary | ICD-10-CM

## 2025-02-24 ENCOUNTER — OFFICE VISIT (OUTPATIENT)
Dept: OBSTETRICS AND GYNECOLOGY | Facility: CLINIC | Age: 45
End: 2025-02-24
Payer: COMMERCIAL

## 2025-02-24 VITALS
WEIGHT: 172 LBS | SYSTOLIC BLOOD PRESSURE: 140 MMHG | DIASTOLIC BLOOD PRESSURE: 74 MMHG | HEIGHT: 63 IN | BODY MASS INDEX: 30.48 KG/M2

## 2025-02-24 DIAGNOSIS — N94.89 ADNEXAL MASS: ICD-10-CM

## 2025-02-24 DIAGNOSIS — N93.9 ABNORMAL UTERINE BLEEDING (AUB): ICD-10-CM

## 2025-02-24 DIAGNOSIS — D50.0 IRON DEFICIENCY ANEMIA DUE TO CHRONIC BLOOD LOSS: ICD-10-CM

## 2025-02-24 DIAGNOSIS — N94.89 ENDOMETRIAL MASS: Primary | ICD-10-CM

## 2025-02-24 RX ORDER — CYCLOBENZAPRINE HCL 10 MG
10 TABLET ORAL 3 TIMES DAILY PRN
Qty: 30 TABLET | Refills: 0 | Status: SHIPPED | OUTPATIENT
Start: 2025-02-24

## 2025-02-24 NOTE — PROGRESS NOTES
Chief Complaint   Patient presents with    Follow-up     Menorrhagia        Subjective   HPI  Siomara Duffy is a 45 y.o. female, . Her last LMP was Patient's last menstrual period was 2025 (exact date).. who presents for follow up on menorrhagia.      At her last visit she was treated with expectant management. Since then she reports her symptoms/issue has remained unchanged. The patient reports the last 2 periods have been incredibly painful to the point she almost passed out from pain.  She notes bleeding very heavily currently.  She continues lysteda and iron.      Additional OB/GYN History       Last Completed Pap Smear            PAP SMEAR (Every 3 Years) Next due on 2024  LIQUID-BASED PAP SMEAR WITH HPV GENOTYPING REGARDLESS OF INTERPRETATION (MARCELO,COR,MAD)                    Last Completed Mammogram       This patient has no relevant Health Maintenance data.            Tobacco Usage?: No   OB History          2    Para   1    Term   1            AB   1    Living   1         SAB        IAB        Ectopic        Molar        Multiple        Live Births   1                  Current Outpatient Medications:     cyclobenzaprine (FLEXERIL) 10 MG tablet, Take 1 tablet by mouth 3 (Three) Times a Day As Needed for Muscle Spasms., Disp: 30 tablet, Rfl: 0    Specialty Vitamins Products (MENOPAUSE SUPPORT PO), Take  by mouth., Disp: , Rfl:     Tranexamic Acid (Lysteda) 650 MG tablet, 2 tablets by mouth 3 times daily for 5 days, Disp: 30 tablet, Rfl: 12     Past Medical History:   Diagnosis Date    Anemia         Past Surgical History:   Procedure Laterality Date    WISDOM TOOTH EXTRACTION         The additional following portions of the patient's history were reviewed and updated as appropriate: allergies.    Review of Systems   Genitourinary:  Positive for menstrual problem.   All other systems reviewed and are negative.      I have reviewed and agree with the  "HPI, ROS, and historical information as entered above. Halina Martinez MD      Objective   /74   Ht 160 cm (63\")   Wt 78 kg (172 lb)   LMP 02/21/2025 (Exact Date)   BMI 30.47 kg/m²     Physical Exam    Physical Exam:  General:  well developed; well nourished  no acute distress  mentation appropriate  Patient is far less pale/yellow than prior   Abdomen: soft, non-tender; no masses  no umbilical or inguinal hernias are present   Pelvis: Not performed.        Assessment & Plan     Assessment     Problem List Items Addressed This Visit          THERAPY PLAN 1 - INFUSION TREATMENT    Abnormal uterine bleeding (AUB)    Relevant Orders    Case Request (Completed)    CBC & Differential (Completed)    Iron deficiency anemia due to chronic blood loss    Relevant Orders    Case Request (Completed)    CBC & Differential (Completed)       Other    Adnexal mass    Relevant Orders    Case Request (Completed)    Endometrial mass - Primary    Relevant Orders    Case Request (Completed)         Plan     Discussed again ovarian complex cyst and intracavitary fibroid.  Reviewed option for what she is scheduled for, lap RSO, myosure but reminded we may have to do myosure in two settings and risk of bleeding etc.  She states she is just tired of hurting and bleeding so much.  She would like definitive therapy.  Wants ovarian conservation of normal ovary.  Plan robotic-assisted TLH/RSO/LS.    Today I discussed with Siomara the risks of her upcoming surgical procedure. Risks including intraoperative bleeding, infection at the site of surgery, damage to the adjacent surrounding organs, catheter induced urinary tract infections and the small risk for deep vein thrombosis were all explained. Additionally, the small risk for reoperation in the event of unanticipated bleeding or surgical injury was discussed.  All of her questions were answered fully.  She left with a very clear understanding of the preoperative surgical " indications and the nature of the surgery for which she is being scheduled.    Return for for preop appointment.        Halina Martinez MD  02/24/2025

## 2025-02-25 LAB
BASOPHILS # BLD AUTO: 0.03 10*3/MM3 (ref 0–0.2)
BASOPHILS NFR BLD AUTO: 0.6 % (ref 0–1.5)
EOSINOPHIL # BLD AUTO: 0.16 10*3/MM3 (ref 0–0.4)
EOSINOPHIL NFR BLD AUTO: 3.1 % (ref 0.3–6.2)
ERYTHROCYTE [DISTWIDTH] IN BLOOD BY AUTOMATED COUNT: 15.6 % (ref 12.3–15.4)
HCT VFR BLD AUTO: 31.4 % (ref 34–46.6)
HGB BLD-MCNC: 8.8 G/DL (ref 12–15.9)
IMM GRANULOCYTES # BLD AUTO: 0.01 10*3/MM3 (ref 0–0.05)
IMM GRANULOCYTES NFR BLD AUTO: 0.2 % (ref 0–0.5)
LYMPHOCYTES # BLD AUTO: 1.24 10*3/MM3 (ref 0.7–3.1)
LYMPHOCYTES NFR BLD AUTO: 24.1 % (ref 19.6–45.3)
MCH RBC QN AUTO: 23.7 PG (ref 26.6–33)
MCHC RBC AUTO-ENTMCNC: 28 G/DL (ref 31.5–35.7)
MCV RBC AUTO: 84.4 FL (ref 79–97)
MONOCYTES # BLD AUTO: 0.57 10*3/MM3 (ref 0.1–0.9)
MONOCYTES NFR BLD AUTO: 11.1 % (ref 5–12)
NEUTROPHILS # BLD AUTO: 3.14 10*3/MM3 (ref 1.7–7)
NEUTROPHILS NFR BLD AUTO: 60.9 % (ref 42.7–76)
NRBC BLD AUTO-RTO: 0 /100 WBC (ref 0–0.2)
PLATELET # BLD AUTO: 303 10*3/MM3 (ref 140–450)
RBC # BLD AUTO: 3.72 10*6/MM3 (ref 3.77–5.28)
WBC # BLD AUTO: 5.15 10*3/MM3 (ref 3.4–10.8)

## 2025-03-31 ENCOUNTER — PRE-ADMISSION TESTING (OUTPATIENT)
Dept: PREADMISSION TESTING | Facility: HOSPITAL | Age: 45
End: 2025-03-31
Payer: COMMERCIAL

## 2025-03-31 ENCOUNTER — PREP FOR SURGERY (OUTPATIENT)
Dept: OTHER | Facility: HOSPITAL | Age: 45
End: 2025-03-31
Payer: COMMERCIAL

## 2025-03-31 ENCOUNTER — OFFICE VISIT (OUTPATIENT)
Dept: OBSTETRICS AND GYNECOLOGY | Facility: CLINIC | Age: 45
End: 2025-03-31
Payer: COMMERCIAL

## 2025-03-31 VITALS — WEIGHT: 173.5 LBS | HEIGHT: 64 IN | BODY MASS INDEX: 29.62 KG/M2

## 2025-03-31 VITALS
WEIGHT: 174 LBS | BODY MASS INDEX: 30.83 KG/M2 | SYSTOLIC BLOOD PRESSURE: 142 MMHG | HEIGHT: 63 IN | DIASTOLIC BLOOD PRESSURE: 78 MMHG

## 2025-03-31 DIAGNOSIS — N94.89 ENDOMETRIAL MASS: ICD-10-CM

## 2025-03-31 DIAGNOSIS — N94.89 ADNEXAL MASS: ICD-10-CM

## 2025-03-31 DIAGNOSIS — D50.0 IRON DEFICIENCY ANEMIA DUE TO CHRONIC BLOOD LOSS: ICD-10-CM

## 2025-03-31 DIAGNOSIS — N93.9 ABNORMAL UTERINE BLEEDING (AUB): Primary | ICD-10-CM

## 2025-03-31 DIAGNOSIS — N94.89 ADNEXAL MASS: Primary | ICD-10-CM

## 2025-03-31 DIAGNOSIS — N93.9 ABNORMAL UTERINE BLEEDING (AUB): ICD-10-CM

## 2025-03-31 PROBLEM — N83.8 OVARIAN MASS, RIGHT: Status: ACTIVE | Noted: 2025-03-31

## 2025-03-31 LAB
ABO GROUP BLD: NORMAL
ANION GAP SERPL CALCULATED.3IONS-SCNC: 10 MMOL/L (ref 5–15)
ANISOCYTOSIS BLD QL: NORMAL
BASOPHILS # BLD AUTO: 0.03 10*3/MM3 (ref 0–0.2)
BASOPHILS NFR BLD AUTO: 0.6 % (ref 0–1.5)
BLD GP AB SCN SERPL QL: NEGATIVE
BUN SERPL-MCNC: 8 MG/DL (ref 6–20)
BUN/CREAT SERPL: 13.1 (ref 7–25)
CALCIUM SPEC-SCNC: 9.3 MG/DL (ref 8.6–10.5)
CHLORIDE SERPL-SCNC: 102 MMOL/L (ref 98–107)
CO2 SERPL-SCNC: 24 MMOL/L (ref 22–29)
CREAT SERPL-MCNC: 0.61 MG/DL (ref 0.57–1)
DEPRECATED RDW RBC AUTO: 54 FL (ref 37–54)
EGFRCR SERPLBLD CKD-EPI 2021: 112.5 ML/MIN/1.73
EOSINOPHIL # BLD AUTO: 0.11 10*3/MM3 (ref 0–0.4)
EOSINOPHIL NFR BLD AUTO: 2.3 % (ref 0.3–6.2)
ERYTHROCYTE [DISTWIDTH] IN BLOOD BY AUTOMATED COUNT: 16.7 % (ref 12.3–15.4)
GLUCOSE SERPL-MCNC: 94 MG/DL (ref 65–99)
HCT VFR BLD AUTO: 27.2 % (ref 34–46.6)
HGB BLD-MCNC: 7.5 G/DL (ref 12–15.9)
HYPOCHROMIA BLD QL: NORMAL
IMM GRANULOCYTES # BLD AUTO: 0.06 10*3/MM3 (ref 0–0.05)
IMM GRANULOCYTES NFR BLD AUTO: 1.3 % (ref 0–0.5)
IRON 24H UR-MRATE: 179 MCG/DL (ref 37–145)
IRON SATN MFR SERPL: 39 % (ref 20–50)
LYMPHOCYTES # BLD AUTO: 1.2 10*3/MM3 (ref 0.7–3.1)
LYMPHOCYTES NFR BLD AUTO: 25.2 % (ref 19.6–45.3)
MCH RBC QN AUTO: 24.4 PG (ref 26.6–33)
MCHC RBC AUTO-ENTMCNC: 27.6 G/DL (ref 31.5–35.7)
MCV RBC AUTO: 88.3 FL (ref 79–97)
MONOCYTES # BLD AUTO: 0.54 10*3/MM3 (ref 0.1–0.9)
MONOCYTES NFR BLD AUTO: 11.3 % (ref 5–12)
NEUTROPHILS NFR BLD AUTO: 2.82 10*3/MM3 (ref 1.7–7)
NEUTROPHILS NFR BLD AUTO: 59.3 % (ref 42.7–76)
NRBC BLD AUTO-RTO: 0 /100 WBC (ref 0–0.2)
OVALOCYTES BLD QL SMEAR: NORMAL
PLAT MORPH BLD: NORMAL
PLATELET # BLD AUTO: 392 10*3/MM3 (ref 140–450)
PMV BLD AUTO: 10.8 FL (ref 6–12)
POLYCHROMASIA BLD QL SMEAR: NORMAL
POTASSIUM SERPL-SCNC: 4.7 MMOL/L (ref 3.5–5.2)
RBC # BLD AUTO: 3.08 10*6/MM3 (ref 3.77–5.28)
RH BLD: POSITIVE
SODIUM SERPL-SCNC: 136 MMOL/L (ref 136–145)
T&S EXPIRATION DATE: NORMAL
TIBC SERPL-MCNC: 463 MCG/DL (ref 298–536)
TRANSFERRIN SERPL-MCNC: 311 MG/DL (ref 200–360)
WBC MORPH BLD: NORMAL
WBC NRBC COR # BLD AUTO: 4.76 10*3/MM3 (ref 3.4–10.8)

## 2025-03-31 PROCEDURE — 85025 COMPLETE CBC W/AUTO DIFF WBC: CPT

## 2025-03-31 PROCEDURE — 86901 BLOOD TYPING SEROLOGIC RH(D): CPT | Performed by: OBSTETRICS & GYNECOLOGY

## 2025-03-31 PROCEDURE — 80048 BASIC METABOLIC PNL TOTAL CA: CPT

## 2025-03-31 PROCEDURE — 85007 BL SMEAR W/DIFF WBC COUNT: CPT

## 2025-03-31 PROCEDURE — 86850 RBC ANTIBODY SCREEN: CPT | Performed by: OBSTETRICS & GYNECOLOGY

## 2025-03-31 PROCEDURE — 86900 BLOOD TYPING SEROLOGIC ABO: CPT | Performed by: OBSTETRICS & GYNECOLOGY

## 2025-03-31 PROCEDURE — 84466 ASSAY OF TRANSFERRIN: CPT

## 2025-03-31 PROCEDURE — 83540 ASSAY OF IRON: CPT

## 2025-03-31 RX ORDER — PHENAZOPYRIDINE HYDROCHLORIDE 100 MG/1
200 TABLET, FILM COATED ORAL ONCE
Status: CANCELLED | OUTPATIENT
Start: 2025-03-31 | End: 2025-03-31

## 2025-03-31 RX ORDER — GABAPENTIN 300 MG/1
600 CAPSULE ORAL ONCE
Status: CANCELLED | OUTPATIENT
Start: 2025-03-31 | End: 2025-03-31

## 2025-03-31 RX ORDER — SCOPOLAMINE 1 MG/3D
1 PATCH, EXTENDED RELEASE TRANSDERMAL CONTINUOUS
Status: CANCELLED | OUTPATIENT
Start: 2025-03-31 | End: 2025-04-03

## 2025-03-31 RX ORDER — SODIUM CHLORIDE, SODIUM LACTATE, POTASSIUM CHLORIDE, CALCIUM CHLORIDE 600; 310; 30; 20 MG/100ML; MG/100ML; MG/100ML; MG/100ML
125 INJECTION, SOLUTION INTRAVENOUS CONTINUOUS
Status: CANCELLED | OUTPATIENT
Start: 2025-03-31 | End: 2025-03-31

## 2025-03-31 RX ORDER — SODIUM CHLORIDE 0.9 % (FLUSH) 0.9 %
3 SYRINGE (ML) INJECTION EVERY 12 HOURS SCHEDULED
Status: CANCELLED | OUTPATIENT
Start: 2025-03-31

## 2025-03-31 RX ORDER — SODIUM CHLORIDE 9 MG/ML
40 INJECTION, SOLUTION INTRAVENOUS AS NEEDED
Status: CANCELLED | OUTPATIENT
Start: 2025-03-31

## 2025-03-31 RX ORDER — SODIUM CHLORIDE 0.9 % (FLUSH) 0.9 %
10 SYRINGE (ML) INJECTION AS NEEDED
Status: CANCELLED | OUTPATIENT
Start: 2025-03-31

## 2025-03-31 RX ORDER — ACETAMINOPHEN 500 MG
1000 TABLET ORAL ONCE
Status: CANCELLED | OUTPATIENT
Start: 2025-03-31 | End: 2025-03-31

## 2025-03-31 NOTE — DISCHARGE INSTRUCTIONS
The following information and instructions were given:    Do not eat, drink, smoke or chew gum after midnight the night before surgery. This includes no mints.  Take all routine, prescribed medications including heart and blood pressure medicines with a sip of water unless otherwise instructed by your physician.   Do NOT take diabetic medication unless instructed by your physician.      DO NOT shave for two days before your procedure.  Do not wear makeup.      DO NOT wear fingernail polish (gel/regular) and/or acrylic/artificial nails on the day of surgery. If you had a recent manicure and would rather not remove polish or artificial nails, the minimum requirement is that the polish/artificial nails must be removed from the middle finger on each hand.      If you are having surgery/procedure on an upper extremity, fingernail polish/artificial fingernails must be removed for surgery.  NO EXCEPTIONS.      If you are having surgery/procedure on a lower extremity, toenail polish on both extremities must be removed for surgery.  NO EXCEPTIONS.    Remove all jewelry (advise to go to jeweler if unable to remove).  Jewelry, especially rings, can no longer be taped for surgery.    Leave anything you consider valuable at home.    Leave your suitcase in the car until after your surgery if you are staying overnight.    Bring the following with you the day of your procedure (when applicable):       -Picture ID and insurance cards       -Co-pay/deductible required by insurance       -Medications in the original bottles or a detailed list (name, dosage, frequency of medications) including all over-the-counter medications if not brought to PAT       -Copy of advance directive, living will or power of  documents if not brought to PAT       -CPAP or BIPAP mask and tubing (do not bring machine)       -Skin prep instruction(s) sheet       -PAT Pass    Educational handout or binder (joint replacements) related to procedure given  to patient.  Educational handout also includes general information related to the recovery that mentions signs and symptoms of infection and when to call the doctor.    When applicable, an ERAS handout was given to patient.    Respirex use and pneumonia prevention education provided in Pre Admission Testing general education video.    Information related to infection and hand hygiene mentioned in Pre Admission Testing general education video. Patient instructed to call their doctor if any of the following symptoms are noted during recovery:  Fever of 100.4 F or higher, incision that is warm or has increasing bleeding, redness or drainage.    DVT Prevention instructions given in general education video presentation during Pre Admission Testing appointment that stress the importance of ambulation to improve blood circulation.  Also encouraged patient to perform foot exercises when in bed and application of a sequential device may be applied to lower extremities to improve circulation.      Please apply Chlorhexidine wipes to surgical area (if instructed) the night before procedure and the AM of procedure and document date/time of applications on skin prep instruction sheet.    If you are being discharged home the same day as surgery, you must have someone to drive you home and someone must stay with you the first 24 hours your procedure.

## 2025-03-31 NOTE — PROGRESS NOTES
Gynecologic Preoperative Exam Note        Subjective   Siomara Duffy is a 45 y.o. year old  who is scheduled for Robotic assisted hysterectomy with bilateral salpingectomy and possible right oopherectomy and possible cystectomy  partial salpingectomy at [facility] on 25.  Pre Admission testing has been scheduled for 3/31/25  at [facility]. Her pre operative diagnosis is DUB and adnexal mass, iron deficiency due to blood loss . She does not need to see her PCP for preop clearance for this surgery. Patient's last menstrual period was 2025 (exact date).. Her birth control method is no method at present time.  Her BMI is Body mass index is 30.82 kg/m²..  Her medical history is significant for  none . Consents were reviewed and signed. The patient denies a history of blood clots or DVT and does not use Aspirin.    She has reviewed the informational video on 3/31/25.      She understands the risks of bleeding, infection, possible damage to other organ systems, including but not limited to the gastrointestinal tract and genitourinary tract.  She also understands the specific risks listed in the preop information (video, pamphlets, etc.).    She has reviewed and signed the preop consent form.    Her code status is: FULL     She has been instructed to have a light dinner the night before surgery, then nothing to eat or drink after midnight.  The day of surgery do not chew gum or smoke.  Remove all jewelry, nail polish, contact lenses prior to coming to the hospital.  Do not bring valuables or large sums of money with you. Patient was instructed on what time to arrive and where to check in, maps were given.  She was instructed that she will meet an Anesthesiologist and that an IV will be started to provide fluids and sedation.  The total time of procedure was discussed.  She was instructed that she will need a .      Allergies   Allergen Reactions    Sulfa Antibiotics Rash      She has confirmed that she is not allergic to Latex.     She is on the following medications. These were reviewed with the patient today and instructed on which medications are ok to take with a sip of water prior to the surgery.      Current Outpatient Medications:     cyclobenzaprine (FLEXERIL) 10 MG tablet, Take 1 tablet by mouth 3 (Three) Times a Day As Needed for Muscle Spasms., Disp: 30 tablet, Rfl: 0    Specialty Vitamins Products (MENOPAUSE SUPPORT PO), Take  by mouth., Disp: , Rfl:     Tranexamic Acid (Lysteda) 650 MG tablet, 2 tablets by mouth 3 times daily for 5 days, Disp: 30 tablet, Rfl: 12     Past Medical History:   Diagnosis Date    Anemia      Past Surgical History:   Procedure Laterality Date    WISDOM TOOTH EXTRACTION       OB History    Para Term  AB Living   2 1 1 0 1 1   SAB IAB Ectopic Molar Multiple Live Births   0 0 0 0 0 1      # Outcome Date GA Lbr Sumit/2nd Weight Sex Type Anes PTL Lv   2 Term 03    F Vag-Spont EPI  KINDRA   1 AB              Social History     Tobacco Use   Smoking Status Former    Types: Cigarettes   Smokeless Tobacco Never     Social History     Substance and Sexual Activity   Alcohol Use Yes    Alcohol/week: 3.0 standard drinks of alcohol    Types: 3 Glasses of wine per week    Comment: couple glasses a week     Social History     Substance and Sexual Activity   Drug Use Never         Review of Systems   All other systems reviewed and are negative.     All other systems reviewed and negative.          Objective    Vitals:    25 0851   BP: 142/78         Physical Exam  Physical Exam:  General:  well developed; well nourished  no acute distress  mentation appropriate   Abdomen: soft, non-tender; no masses  no umbilical or inguinal hernias are present   Pelvis: Not performed. today     General:  well developed; well nourished  no acute distress  Skin:  No suspicious lesions seen  CVS: RRR, no M/R/G, distal pulses wnl  Resp: CTAB, No W/R/R  Ext:  2+ pulses, no edema      Assessment   Problem List Items Addressed This Visit          THERAPY PLAN 1 - INFUSION TREATMENT    Abnormal uterine bleeding (AUB)    Iron deficiency anemia due to chronic blood loss       Other    Adnexal mass - Primary    Endometrial mass                Plan   [facility] TLH with bilateral salpingectomy, right oophorectomy, possible cystoscopy  Risks of surgery were reviewed with the patient including risks of bleeding, infection, damage to other organ systems including, but not limited to GI and  tracts (bowel, bladder, blood vessels, nerves) risks of Anesthesia, as well as the risk the surgery will not produce the desired results, possible need for additional surgery, death, risk of uterine perforation.  PAT Scheduled    Ravinder has been obtained and reviewed   Pain Medication Consent Form has been signed.  A review regarding proper medication administration, impact on driving and working while medicated, the safety of use in pregnancy, the potential for overdose and the proper disposal and storage of controlled medications has been done with the patient.          Halina Martinez MD  Visit Date: 3/31/2025

## 2025-04-01 ENCOUNTER — ANESTHESIA EVENT (OUTPATIENT)
Dept: PERIOP | Facility: HOSPITAL | Age: 45
End: 2025-04-01
Payer: COMMERCIAL

## 2025-04-01 RX ORDER — SODIUM CHLORIDE 0.9 % (FLUSH) 0.9 %
10 SYRINGE (ML) INJECTION AS NEEDED
Status: CANCELLED | OUTPATIENT
Start: 2025-04-01

## 2025-04-01 RX ORDER — SODIUM CHLORIDE 0.9 % (FLUSH) 0.9 %
10 SYRINGE (ML) INJECTION EVERY 12 HOURS SCHEDULED
Status: CANCELLED | OUTPATIENT
Start: 2025-04-01

## 2025-04-01 RX ORDER — FAMOTIDINE 10 MG/ML
20 INJECTION, SOLUTION INTRAVENOUS ONCE
Status: CANCELLED | OUTPATIENT
Start: 2025-04-01 | End: 2025-04-01

## 2025-04-02 ENCOUNTER — ANESTHESIA (OUTPATIENT)
Dept: PERIOP | Facility: HOSPITAL | Age: 45
End: 2025-04-02
Payer: COMMERCIAL

## 2025-04-02 ENCOUNTER — HOSPITAL ENCOUNTER (OUTPATIENT)
Facility: HOSPITAL | Age: 45
Discharge: HOME | End: 2025-04-02
Attending: OBSTETRICS & GYNECOLOGY | Admitting: OBSTETRICS & GYNECOLOGY
Payer: COMMERCIAL

## 2025-04-02 VITALS
RESPIRATION RATE: 16 BRPM | SYSTOLIC BLOOD PRESSURE: 124 MMHG | OXYGEN SATURATION: 95 % | DIASTOLIC BLOOD PRESSURE: 71 MMHG | TEMPERATURE: 97.8 F | WEIGHT: 173 LBS | BODY MASS INDEX: 29.53 KG/M2 | HEIGHT: 64 IN | HEART RATE: 80 BPM

## 2025-04-02 DIAGNOSIS — N94.89 ADNEXAL MASS: ICD-10-CM

## 2025-04-02 DIAGNOSIS — D50.0 IRON DEFICIENCY ANEMIA DUE TO CHRONIC BLOOD LOSS: ICD-10-CM

## 2025-04-02 DIAGNOSIS — N94.89 ENDOMETRIAL MASS: ICD-10-CM

## 2025-04-02 DIAGNOSIS — N93.9 ABNORMAL UTERINE BLEEDING (AUB): ICD-10-CM

## 2025-04-02 LAB
ABO GROUP BLD: NORMAL
B-HCG UR QL: NEGATIVE
DEPRECATED RDW RBC AUTO: 53.1 FL (ref 37–54)
ERYTHROCYTE [DISTWIDTH] IN BLOOD BY AUTOMATED COUNT: 16.3 % (ref 12.3–15.4)
EXPIRATION DATE: NORMAL
HCT VFR BLD AUTO: 29.2 % (ref 34–46.6)
HGB BLD-MCNC: 8 G/DL (ref 12–15.9)
INTERNAL NEGATIVE CONTROL: NEGATIVE
INTERNAL POSITIVE CONTROL: POSITIVE
Lab: NORMAL
MCH RBC QN AUTO: 24.2 PG (ref 26.6–33)
MCHC RBC AUTO-ENTMCNC: 27.4 G/DL (ref 31.5–35.7)
MCV RBC AUTO: 88.5 FL (ref 79–97)
PLATELET # BLD AUTO: 349 10*3/MM3 (ref 140–450)
PMV BLD AUTO: 10.9 FL (ref 6–12)
RBC # BLD AUTO: 3.3 10*6/MM3 (ref 3.77–5.28)
RH BLD: POSITIVE
WBC NRBC COR # BLD AUTO: 12.17 10*3/MM3 (ref 3.4–10.8)

## 2025-04-02 PROCEDURE — 25010000002 DEXAMETHASONE PER 1 MG: Performed by: NURSE ANESTHETIST, CERTIFIED REGISTERED

## 2025-04-02 PROCEDURE — 25810000003 LACTATED RINGERS PER 1000 ML: Performed by: NURSE ANESTHETIST, CERTIFIED REGISTERED

## 2025-04-02 PROCEDURE — 25010000002 PROPOFOL 10 MG/ML EMULSION: Performed by: NURSE ANESTHETIST, CERTIFIED REGISTERED

## 2025-04-02 PROCEDURE — 25010000002 KETOROLAC TROMETHAMINE PER 15 MG: Performed by: OBSTETRICS & GYNECOLOGY

## 2025-04-02 PROCEDURE — 25010000002 LIDOCAINE PF 1% 1 % SOLUTION: Performed by: ANESTHESIOLOGY

## 2025-04-02 PROCEDURE — 88331 PATH CONSLTJ SURG 1 BLK 1SPC: CPT | Performed by: OBSTETRICS & GYNECOLOGY

## 2025-04-02 PROCEDURE — 86900 BLOOD TYPING SEROLOGIC ABO: CPT

## 2025-04-02 PROCEDURE — G0378 HOSPITAL OBSERVATION PER HR: HCPCS

## 2025-04-02 PROCEDURE — S0260 H&P FOR SURGERY: HCPCS | Performed by: OBSTETRICS & GYNECOLOGY

## 2025-04-02 PROCEDURE — 81025 URINE PREGNANCY TEST: CPT | Performed by: ANESTHESIOLOGY

## 2025-04-02 PROCEDURE — 88307 TISSUE EXAM BY PATHOLOGIST: CPT | Performed by: OBSTETRICS & GYNECOLOGY

## 2025-04-02 PROCEDURE — 58573 TLH W/T/O UTERUS OVER 250 G: CPT | Performed by: PHYSICIAN ASSISTANT

## 2025-04-02 PROCEDURE — 58573 TLH W/T/O UTERUS OVER 250 G: CPT | Performed by: OBSTETRICS & GYNECOLOGY

## 2025-04-02 PROCEDURE — 86901 BLOOD TYPING SEROLOGIC RH(D): CPT

## 2025-04-02 PROCEDURE — 88341 IMHCHEM/IMCYTCHM EA ADD ANTB: CPT | Performed by: OBSTETRICS & GYNECOLOGY

## 2025-04-02 PROCEDURE — 85027 COMPLETE CBC AUTOMATED: CPT | Performed by: OBSTETRICS & GYNECOLOGY

## 2025-04-02 PROCEDURE — 25010000002 SUGAMMADEX 200 MG/2ML SOLUTION: Performed by: NURSE ANESTHETIST, CERTIFIED REGISTERED

## 2025-04-02 PROCEDURE — 25010000002 CEFAZOLIN PER 500 MG: Performed by: OBSTETRICS & GYNECOLOGY

## 2025-04-02 PROCEDURE — 88305 TISSUE EXAM BY PATHOLOGIST: CPT | Performed by: OBSTETRICS & GYNECOLOGY

## 2025-04-02 PROCEDURE — 25010000002 ONDANSETRON PER 1 MG: Performed by: NURSE ANESTHETIST, CERTIFIED REGISTERED

## 2025-04-02 PROCEDURE — 25010000002 LIDOCAINE PF 1% 1 % SOLUTION: Performed by: NURSE ANESTHETIST, CERTIFIED REGISTERED

## 2025-04-02 PROCEDURE — 25010000002 FENTANYL CITRATE (PF) 50 MCG/ML SOLUTION: Performed by: NURSE ANESTHETIST, CERTIFIED REGISTERED

## 2025-04-02 PROCEDURE — 25010000002 DROPERIDOL PER 5 MG: Performed by: NURSE ANESTHETIST, CERTIFIED REGISTERED

## 2025-04-02 PROCEDURE — 25010000002 HYDROMORPHONE PER 4 MG: Performed by: NURSE ANESTHETIST, CERTIFIED REGISTERED

## 2025-04-02 PROCEDURE — 25010000002 FENTANYL CITRATE (PF) 100 MCG/2ML SOLUTION: Performed by: NURSE ANESTHETIST, CERTIFIED REGISTERED

## 2025-04-02 DEVICE — KNOTLESS TISSUE CONTROL DEVICE, VIOLET UNIDIRECTIONAL (ANTIBACTERIAL) SYNTHETIC ABSORBABLE DEVICE
Type: IMPLANTABLE DEVICE | Site: ABDOMEN | Status: FUNCTIONAL
Brand: STRATAFIX

## 2025-04-02 RX ORDER — ONDANSETRON 4 MG/1
4 TABLET, ORALLY DISINTEGRATING ORAL EVERY 6 HOURS PRN
Status: DISCONTINUED | OUTPATIENT
Start: 2025-04-02 | End: 2025-04-02 | Stop reason: HOSPADM

## 2025-04-02 RX ORDER — MIDAZOLAM HYDROCHLORIDE 1 MG/ML
1 INJECTION, SOLUTION INTRAMUSCULAR; INTRAVENOUS
Status: DISCONTINUED | OUTPATIENT
Start: 2025-04-02 | End: 2025-04-02 | Stop reason: HOSPADM

## 2025-04-02 RX ORDER — HYDROMORPHONE HYDROCHLORIDE 1 MG/ML
0.5 INJECTION, SOLUTION INTRAMUSCULAR; INTRAVENOUS; SUBCUTANEOUS
Status: DISCONTINUED | OUTPATIENT
Start: 2025-04-02 | End: 2025-04-02 | Stop reason: HOSPADM

## 2025-04-02 RX ORDER — SODIUM CHLORIDE, SODIUM LACTATE, POTASSIUM CHLORIDE, CALCIUM CHLORIDE 600; 310; 30; 20 MG/100ML; MG/100ML; MG/100ML; MG/100ML
INJECTION, SOLUTION INTRAVENOUS CONTINUOUS PRN
Status: DISCONTINUED | OUTPATIENT
Start: 2025-04-02 | End: 2025-04-02 | Stop reason: SURG

## 2025-04-02 RX ORDER — DEXAMETHASONE SODIUM PHOSPHATE 4 MG/ML
INJECTION, SOLUTION INTRA-ARTICULAR; INTRALESIONAL; INTRAMUSCULAR; INTRAVENOUS; SOFT TISSUE AS NEEDED
Status: DISCONTINUED | OUTPATIENT
Start: 2025-04-02 | End: 2025-04-02 | Stop reason: SURG

## 2025-04-02 RX ORDER — HYDRALAZINE HYDROCHLORIDE 20 MG/ML
5 INJECTION INTRAMUSCULAR; INTRAVENOUS
Status: DISCONTINUED | OUTPATIENT
Start: 2025-04-02 | End: 2025-04-02 | Stop reason: HOSPADM

## 2025-04-02 RX ORDER — SCOPOLAMINE 1 MG/3D
1 PATCH, EXTENDED RELEASE TRANSDERMAL CONTINUOUS
Status: DISCONTINUED | OUTPATIENT
Start: 2025-04-02 | End: 2025-04-02 | Stop reason: HOSPADM

## 2025-04-02 RX ORDER — SODIUM CHLORIDE, SODIUM LACTATE, POTASSIUM CHLORIDE, CALCIUM CHLORIDE 600; 310; 30; 20 MG/100ML; MG/100ML; MG/100ML; MG/100ML
9 INJECTION, SOLUTION INTRAVENOUS CONTINUOUS
Status: DISCONTINUED | OUTPATIENT
Start: 2025-04-03 | End: 2025-04-02 | Stop reason: HOSPADM

## 2025-04-02 RX ORDER — FENTANYL CITRATE 50 UG/ML
50 INJECTION, SOLUTION INTRAMUSCULAR; INTRAVENOUS
Status: DISCONTINUED | OUTPATIENT
Start: 2025-04-02 | End: 2025-04-02 | Stop reason: HOSPADM

## 2025-04-02 RX ORDER — NALOXONE HCL 0.4 MG/ML
0.4 VIAL (ML) INJECTION AS NEEDED
Status: DISCONTINUED | OUTPATIENT
Start: 2025-04-02 | End: 2025-04-02 | Stop reason: HOSPADM

## 2025-04-02 RX ORDER — LIDOCAINE HYDROCHLORIDE 10 MG/ML
INJECTION, SOLUTION EPIDURAL; INFILTRATION; INTRACAUDAL; PERINEURAL AS NEEDED
Status: DISCONTINUED | OUTPATIENT
Start: 2025-04-02 | End: 2025-04-02 | Stop reason: SURG

## 2025-04-02 RX ORDER — BUPIVACAINE HYDROCHLORIDE AND EPINEPHRINE 2.5; 5 MG/ML; UG/ML
INJECTION, SOLUTION INFILTRATION; PERINEURAL AS NEEDED
Status: DISCONTINUED | OUTPATIENT
Start: 2025-04-02 | End: 2025-04-02 | Stop reason: HOSPADM

## 2025-04-02 RX ORDER — OXYCODONE HYDROCHLORIDE 10 MG/1
5 TABLET ORAL EVERY 6 HOURS PRN
Qty: 10 EACH | Refills: 0 | Status: SHIPPED | OUTPATIENT
Start: 2025-04-02 | End: 2025-04-12

## 2025-04-02 RX ORDER — ONDANSETRON 2 MG/ML
INJECTION INTRAMUSCULAR; INTRAVENOUS AS NEEDED
Status: DISCONTINUED | OUTPATIENT
Start: 2025-04-02 | End: 2025-04-02 | Stop reason: SDUPTHER

## 2025-04-02 RX ORDER — DIPHENHYDRAMINE HCL 25 MG
25 CAPSULE ORAL NIGHTLY PRN
Status: DISCONTINUED | OUTPATIENT
Start: 2025-04-02 | End: 2025-04-02 | Stop reason: HOSPADM

## 2025-04-02 RX ORDER — SODIUM CHLORIDE 9 MG/ML
9 INJECTION, SOLUTION INTRAVENOUS AS NEEDED
Status: DISCONTINUED | OUTPATIENT
Start: 2025-04-02 | End: 2025-04-02 | Stop reason: HOSPADM

## 2025-04-02 RX ORDER — TRAMADOL HYDROCHLORIDE 50 MG/1
50 TABLET ORAL EVERY 6 HOURS PRN
Status: DISCONTINUED | OUTPATIENT
Start: 2025-04-02 | End: 2025-04-02 | Stop reason: HOSPADM

## 2025-04-02 RX ORDER — DOCUSATE SODIUM 100 MG/1
100 CAPSULE, LIQUID FILLED ORAL 2 TIMES DAILY
Status: DISCONTINUED | OUTPATIENT
Start: 2025-04-02 | End: 2025-04-02 | Stop reason: HOSPADM

## 2025-04-02 RX ORDER — PROPOFOL 10 MG/ML
VIAL (ML) INTRAVENOUS AS NEEDED
Status: DISCONTINUED | OUTPATIENT
Start: 2025-04-02 | End: 2025-04-02 | Stop reason: SURG

## 2025-04-02 RX ORDER — GABAPENTIN 100 MG/1
100 CAPSULE ORAL 3 TIMES DAILY
Qty: 5 CAPSULE | Refills: 0 | Status: SHIPPED | OUTPATIENT
Start: 2025-04-02 | End: 2025-04-04

## 2025-04-02 RX ORDER — OXYCODONE AND ACETAMINOPHEN 7.5; 325 MG/1; MG/1
1 TABLET ORAL EVERY 4 HOURS PRN
Status: DISCONTINUED | OUTPATIENT
Start: 2025-04-02 | End: 2025-04-02 | Stop reason: HOSPADM

## 2025-04-02 RX ORDER — PROMETHAZINE HYDROCHLORIDE 25 MG/1
25 TABLET ORAL ONCE AS NEEDED
Status: DISCONTINUED | OUTPATIENT
Start: 2025-04-02 | End: 2025-04-02 | Stop reason: HOSPADM

## 2025-04-02 RX ORDER — PHENAZOPYRIDINE HYDROCHLORIDE 100 MG/1
200 TABLET, FILM COATED ORAL ONCE
Status: COMPLETED | OUTPATIENT
Start: 2025-04-02 | End: 2025-04-02

## 2025-04-02 RX ORDER — KETOROLAC TROMETHAMINE 15 MG/ML
15 INJECTION, SOLUTION INTRAMUSCULAR; INTRAVENOUS
Status: COMPLETED | OUTPATIENT
Start: 2025-04-02 | End: 2025-04-02

## 2025-04-02 RX ORDER — LIDOCAINE HYDROCHLORIDE 10 MG/ML
0.5 INJECTION, SOLUTION EPIDURAL; INFILTRATION; INTRACAUDAL; PERINEURAL ONCE AS NEEDED
Status: COMPLETED | OUTPATIENT
Start: 2025-04-02 | End: 2025-04-02

## 2025-04-02 RX ORDER — DROPERIDOL 2.5 MG/ML
0.62 INJECTION, SOLUTION INTRAMUSCULAR; INTRAVENOUS ONCE AS NEEDED
Status: DISCONTINUED | OUTPATIENT
Start: 2025-04-02 | End: 2025-04-02 | Stop reason: HOSPADM

## 2025-04-02 RX ORDER — DEXTROSE MONOHYDRATE, SODIUM CHLORIDE, AND POTASSIUM CHLORIDE 50; 1.49; 4.5 G/1000ML; G/1000ML; G/1000ML
100 INJECTION, SOLUTION INTRAVENOUS CONTINUOUS
Status: DISCONTINUED | OUTPATIENT
Start: 2025-04-02 | End: 2025-04-02 | Stop reason: HOSPADM

## 2025-04-02 RX ORDER — SODIUM CHLORIDE 0.9 % (FLUSH) 0.9 %
3 SYRINGE (ML) INJECTION EVERY 12 HOURS SCHEDULED
Status: DISCONTINUED | OUTPATIENT
Start: 2025-04-02 | End: 2025-04-02 | Stop reason: HOSPADM

## 2025-04-02 RX ORDER — IBUPROFEN 600 MG/1
600 TABLET, FILM COATED ORAL EVERY 6 HOURS SCHEDULED
Status: DISCONTINUED | OUTPATIENT
Start: 2025-04-02 | End: 2025-04-02 | Stop reason: HOSPADM

## 2025-04-02 RX ORDER — DIPHENHYDRAMINE HYDROCHLORIDE 50 MG/ML
25 INJECTION, SOLUTION INTRAMUSCULAR; INTRAVENOUS NIGHTLY PRN
Status: DISCONTINUED | OUTPATIENT
Start: 2025-04-02 | End: 2025-04-02 | Stop reason: HOSPADM

## 2025-04-02 RX ORDER — FENTANYL CITRATE 50 UG/ML
INJECTION, SOLUTION INTRAMUSCULAR; INTRAVENOUS AS NEEDED
Status: DISCONTINUED | OUTPATIENT
Start: 2025-04-02 | End: 2025-04-02 | Stop reason: SDUPTHER

## 2025-04-02 RX ORDER — TRAMADOL HYDROCHLORIDE 50 MG/1
50 TABLET ORAL EVERY 6 HOURS PRN
Qty: 15 TABLET | Refills: 0 | Status: SHIPPED | OUTPATIENT
Start: 2025-04-02 | End: 2025-04-06

## 2025-04-02 RX ORDER — ONDANSETRON 2 MG/ML
4 INJECTION INTRAMUSCULAR; INTRAVENOUS ONCE AS NEEDED
Status: DISCONTINUED | OUTPATIENT
Start: 2025-04-02 | End: 2025-04-02 | Stop reason: HOSPADM

## 2025-04-02 RX ORDER — IBUPROFEN 600 MG/1
600 TABLET, FILM COATED ORAL EVERY 6 HOURS PRN
Qty: 30 TABLET | Refills: 1 | Status: SHIPPED | OUTPATIENT
Start: 2025-04-02

## 2025-04-02 RX ORDER — MAGNESIUM HYDROXIDE 1200 MG/15ML
LIQUID ORAL AS NEEDED
Status: DISCONTINUED | OUTPATIENT
Start: 2025-04-02 | End: 2025-04-02 | Stop reason: HOSPADM

## 2025-04-02 RX ORDER — SODIUM CHLORIDE, SODIUM LACTATE, POTASSIUM CHLORIDE, CALCIUM CHLORIDE 600; 310; 30; 20 MG/100ML; MG/100ML; MG/100ML; MG/100ML
9 INJECTION, SOLUTION INTRAVENOUS CONTINUOUS
Status: DISCONTINUED | OUTPATIENT
Start: 2025-04-02 | End: 2025-04-02 | Stop reason: HOSPADM

## 2025-04-02 RX ORDER — FAMOTIDINE 20 MG/1
20 TABLET, FILM COATED ORAL ONCE
Status: COMPLETED | OUTPATIENT
Start: 2025-04-02 | End: 2025-04-02

## 2025-04-02 RX ORDER — DROPERIDOL 2.5 MG/ML
0.62 INJECTION, SOLUTION INTRAMUSCULAR; INTRAVENOUS
Status: DISCONTINUED | OUTPATIENT
Start: 2025-04-02 | End: 2025-04-02 | Stop reason: HOSPADM

## 2025-04-02 RX ORDER — HYDROCODONE BITARTRATE AND ACETAMINOPHEN 5; 325 MG/1; MG/1
1 TABLET ORAL ONCE AS NEEDED
Status: DISCONTINUED | OUTPATIENT
Start: 2025-04-02 | End: 2025-04-02 | Stop reason: HOSPADM

## 2025-04-02 RX ORDER — SODIUM CHLORIDE 0.9 % (FLUSH) 0.9 %
3-10 SYRINGE (ML) INJECTION AS NEEDED
Status: DISCONTINUED | OUTPATIENT
Start: 2025-04-02 | End: 2025-04-02 | Stop reason: HOSPADM

## 2025-04-02 RX ORDER — PROMETHAZINE HYDROCHLORIDE 25 MG/1
25 SUPPOSITORY RECTAL ONCE AS NEEDED
Status: DISCONTINUED | OUTPATIENT
Start: 2025-04-02 | End: 2025-04-02 | Stop reason: HOSPADM

## 2025-04-02 RX ORDER — GABAPENTIN 300 MG/1
600 CAPSULE ORAL ONCE
Status: COMPLETED | OUTPATIENT
Start: 2025-04-02 | End: 2025-04-02

## 2025-04-02 RX ORDER — ACETAMINOPHEN 500 MG
1000 TABLET ORAL EVERY 8 HOURS PRN
Qty: 100 TABLET | Refills: 0 | Status: SHIPPED | OUTPATIENT
Start: 2025-04-02

## 2025-04-02 RX ORDER — IPRATROPIUM BROMIDE AND ALBUTEROL SULFATE 2.5; .5 MG/3ML; MG/3ML
3 SOLUTION RESPIRATORY (INHALATION) ONCE AS NEEDED
Status: DISCONTINUED | OUTPATIENT
Start: 2025-04-02 | End: 2025-04-02 | Stop reason: HOSPADM

## 2025-04-02 RX ORDER — ONDANSETRON 2 MG/ML
4 INJECTION INTRAMUSCULAR; INTRAVENOUS EVERY 6 HOURS PRN
Status: DISCONTINUED | OUTPATIENT
Start: 2025-04-02 | End: 2025-04-02 | Stop reason: HOSPADM

## 2025-04-02 RX ORDER — OXYCODONE HYDROCHLORIDE 10 MG/1
10 TABLET ORAL EVERY 4 HOURS PRN
Status: DISCONTINUED | OUTPATIENT
Start: 2025-04-02 | End: 2025-04-02 | Stop reason: HOSPADM

## 2025-04-02 RX ORDER — ACETAMINOPHEN 500 MG
1000 TABLET ORAL EVERY 8 HOURS
OUTPATIENT
Start: 2025-04-02

## 2025-04-02 RX ORDER — PSEUDOEPHEDRINE HCL 30 MG
100 TABLET ORAL 2 TIMES DAILY
Qty: 60 EACH | Refills: 2 | Status: SHIPPED | OUTPATIENT
Start: 2025-04-02

## 2025-04-02 RX ORDER — ACETAMINOPHEN 500 MG
1000 TABLET ORAL ONCE
Status: COMPLETED | OUTPATIENT
Start: 2025-04-02 | End: 2025-04-02

## 2025-04-02 RX ORDER — LABETALOL HYDROCHLORIDE 5 MG/ML
5 INJECTION, SOLUTION INTRAVENOUS
Status: DISCONTINUED | OUTPATIENT
Start: 2025-04-02 | End: 2025-04-02 | Stop reason: HOSPADM

## 2025-04-02 RX ORDER — ROCURONIUM BROMIDE 10 MG/ML
INJECTION, SOLUTION INTRAVENOUS AS NEEDED
Status: DISCONTINUED | OUTPATIENT
Start: 2025-04-02 | End: 2025-04-02 | Stop reason: SURG

## 2025-04-02 RX ORDER — GABAPENTIN 100 MG/1
100 CAPSULE ORAL 3 TIMES DAILY
Status: DISCONTINUED | OUTPATIENT
Start: 2025-04-02 | End: 2025-04-02 | Stop reason: HOSPADM

## 2025-04-02 RX ADMIN — SODIUM CHLORIDE, POTASSIUM CHLORIDE, SODIUM LACTATE AND CALCIUM CHLORIDE: 600; 310; 30; 20 INJECTION, SOLUTION INTRAVENOUS at 10:27

## 2025-04-02 RX ADMIN — PROPOFOL INJECTABLE EMULSION 25 MCG/KG/MIN: 10 INJECTION, EMULSION INTRAVENOUS at 08:21

## 2025-04-02 RX ADMIN — IBUPROFEN 600 MG: 600 TABLET, FILM COATED ORAL at 15:57

## 2025-04-02 RX ADMIN — LIDOCAINE HYDROCHLORIDE 50 MG: 10 INJECTION, SOLUTION EPIDURAL; INFILTRATION; INTRACAUDAL; PERINEURAL at 08:05

## 2025-04-02 RX ADMIN — GABAPENTIN 100 MG: 100 CAPSULE ORAL at 15:57

## 2025-04-02 RX ADMIN — ROCURONIUM BROMIDE 20 MG: 10 INJECTION INTRAVENOUS at 09:30

## 2025-04-02 RX ADMIN — ONDANSETRON 4 MG: 2 INJECTION INTRAMUSCULAR; INTRAVENOUS at 10:47

## 2025-04-02 RX ADMIN — LIDOCAINE HYDROCHLORIDE 0.5 ML: 10 INJECTION, SOLUTION EPIDURAL; INFILTRATION; INTRACAUDAL; PERINEURAL at 07:12

## 2025-04-02 RX ADMIN — SODIUM CHLORIDE, POTASSIUM CHLORIDE, SODIUM LACTATE AND CALCIUM CHLORIDE: 600; 310; 30; 20 INJECTION, SOLUTION INTRAVENOUS at 07:58

## 2025-04-02 RX ADMIN — FENTANYL CITRATE 50 MCG: 50 INJECTION, SOLUTION INTRAMUSCULAR; INTRAVENOUS at 11:10

## 2025-04-02 RX ADMIN — KETOROLAC TROMETHAMINE 15 MG: 15 INJECTION, SOLUTION INTRAMUSCULAR; INTRAVENOUS at 18:00

## 2025-04-02 RX ADMIN — SCOPOLAMINE 1 PATCH: 1.5 PATCH, EXTENDED RELEASE TRANSDERMAL at 07:11

## 2025-04-02 RX ADMIN — HYDROMORPHONE HYDROCHLORIDE 0.5 MG: 1 INJECTION, SOLUTION INTRAMUSCULAR; INTRAVENOUS; SUBCUTANEOUS at 11:15

## 2025-04-02 RX ADMIN — ROCURONIUM BROMIDE 20 MG: 10 INJECTION INTRAVENOUS at 10:02

## 2025-04-02 RX ADMIN — PROPOFOL INJECTABLE EMULSION 200 MG: 10 INJECTION, EMULSION INTRAVENOUS at 08:05

## 2025-04-02 RX ADMIN — POTASSIUM CHLORIDE, DEXTROSE MONOHYDRATE AND SODIUM CHLORIDE 100 ML/HR: 150; 5; 450 INJECTION, SOLUTION INTRAVENOUS at 15:56

## 2025-04-02 RX ADMIN — SODIUM CHLORIDE 2000 MG: 900 INJECTION INTRAVENOUS at 08:10

## 2025-04-02 RX ADMIN — FENTANYL CITRATE 50 MCG: 50 INJECTION, SOLUTION INTRAMUSCULAR; INTRAVENOUS at 11:59

## 2025-04-02 RX ADMIN — SUGAMMADEX 200 MG: 100 INJECTION, SOLUTION INTRAVENOUS at 10:48

## 2025-04-02 RX ADMIN — HYDROMORPHONE HYDROCHLORIDE 0.5 MG: 1 INJECTION, SOLUTION INTRAMUSCULAR; INTRAVENOUS; SUBCUTANEOUS at 11:42

## 2025-04-02 RX ADMIN — ACETAMINOPHEN TAB 500 MG 1000 MG: 500 TAB at 07:11

## 2025-04-02 RX ADMIN — FENTANYL CITRATE 50 MCG: 50 INJECTION, SOLUTION INTRAMUSCULAR; INTRAVENOUS at 08:05

## 2025-04-02 RX ADMIN — DEXAMETHASONE SODIUM PHOSPHATE 8 MG: 4 INJECTION INTRA-ARTICULAR; INTRALESIONAL; INTRAMUSCULAR; INTRAVENOUS; SOFT TISSUE at 08:10

## 2025-04-02 RX ADMIN — PHENAZOPYRIDINE HYDROCHLORIDE 200 MG: 100 TABLET ORAL at 07:11

## 2025-04-02 RX ADMIN — ROCURONIUM BROMIDE 50 MG: 10 INJECTION INTRAVENOUS at 08:06

## 2025-04-02 RX ADMIN — FENTANYL CITRATE 50 MCG: 50 INJECTION, SOLUTION INTRAMUSCULAR; INTRAVENOUS at 08:37

## 2025-04-02 RX ADMIN — DROPERIDOL 0.62 MG: 2.5 INJECTION, SOLUTION INTRAMUSCULAR; INTRAVENOUS at 12:24

## 2025-04-02 RX ADMIN — GABAPENTIN 600 MG: 300 CAPSULE ORAL at 07:11

## 2025-04-02 RX ADMIN — FAMOTIDINE 20 MG: 20 TABLET, FILM COATED ORAL at 07:11

## 2025-04-02 RX ADMIN — KETOROLAC TROMETHAMINE 15 MG: 15 INJECTION, SOLUTION INTRAMUSCULAR; INTRAVENOUS at 11:26

## 2025-04-02 NOTE — OP NOTE
OPERATIVE REPORT     DATE OF PROCEDURE: 4/2/2025     SURGEON: Halina Martinez MD    STAFF: Circulator: Tia Zaman RN; Baudilio Valdivia RN; Tia Day RN  Scrub Person: Grace Watt; Janet Hernandez RN      PREOPERATIVE DIAGNOSIS: Endometrial mass [N94.89]  Abnormal uterine bleeding (AUB) [N93.9]  Iron deficiency anemia due to chronic blood loss [D50.0]  Adnexal mass [N94.89]    POSTOPERATIVE DIAGNOSIS: PAULINA       Post-Op Diagnosis Codes:     * Endometrial mass [N94.89]     * Abnormal uterine bleeding (AUB) [N93.9]     * Iron deficiency anemia due to chronic blood loss [D50.0]     * Adnexal mass [N94.89]    Procedure(s):  TOTAL LAPAROSCOPIC HYSTERECTOMY BILATERAL SALPINGECTOMY WITH DAVINCI ROBOT  LAPAROSCOPIC OOPHORECTOMY WITH DAVINCI ROBOT    Surgeon(s):  Halian Martinez MD    Assistant: Livan Park PA was responsible for performing the following activities: Retraction, Suction, Irrigation, Closing, and Held/Positioned Camera and their skilled assistance was necessary for the success of this case.     PROCEDURE PERFORMED:     ANESTHESIA: General    PREOPERATIVE ANTIBIOTICS: Ancef 2gm     ESTIMATED BLOOD LOSS: 140 mL     DRAINS:   [REMOVED] Urethral Catheter Double-lumen 16 Fr. (Removed)       URINE OUTPUT:      SPECIMENS:   Order Name Source Comment Collection Info Order Time   ABO/RH SPECIMEN VERIFICATION PREOP   Collected By: Marjan Wilkinson RN 4/2/2025  7:07 AM   TISSUE PATHOLOGY EXAM Endometrium  Collected By: Halina Martinez MD 4/2/2025  9:54 AM     Release to patient   Routine Release             COMPLICATIONS: None      INTRAOPERATIVE FINDINGS: Enlarged uterus ~16wk size with endometrial mass (FS sent b/c was somewhat soft)        Normal left ovary       Normal tubes       Right ovary with ~8cm dermoid, hair and mucoid tissue within    DESCRIPTION OF PROCEDURE:         After informed consent was obtained and patient was received she was taken operating room where  general anesthesia was found to be adequate. Patient was placed in dorsal lithotomy position and Ramirez catheter was placed in the bladder.       A large uterine elevatOR was placed vaginally for uterine manipulation after the cervix was dilated sufficiently.       An 8 mm supraumbilical skin incision was made by first infiltrating with quarter percent Marcaine in size and scalpel and entering under direct visualization using the Optiview technique.  Patient was placed in Trendelenburg position and abdomen was insufflated with CO2 gas to approximately 15 mmHg.       Abdomen was visualized.  It was grossly free of adhesions but uterus did appear larger than expected.   Decision was made to place 2 right and 1 left robot 8mm ports.  They were placed under direct visualization in similar fashion.       The ureters were seen coursing well away from the operative field.    Robot was then docked in the usual fashion.  30 degree scope was retrieved.  Once instruments were placed in the trocars, the robotic portion of the procedure was begun.     The force bipolar clamp and monopolar scissors were used to clamp burn and cut along the right IP ligament until tube and ovary was detached.  Likewise, the left tube was freed by clamping, burning and cutting along mesosalpinx and across left uteroovarian ligament and vessels.  A grasper was used in the contralateral port for counter traction.     The round ligaments were similarly clamped burned and cut between.  The anterior leaf the broad ligament was taken inferiorly and superiorly to create the bladder flap.  Upon making bladder flap, endometriosis was seen on it just right of midline and was taken with specimen.  The posterior was taken down towards the cervix to allow visualization of the uterine artery.  This was cauterized using force bipolar in multiple cycles before finally cutting.  The bladder was ensured to be down below the ring of the uterine manipulator.  This was  done with a combination of sharp and blunt dissection and gave good result.     Once the  ring could be visualized in a circumferential fashion, monopolar scissors were used to incise under cautery circumferentially until the vaginal cuff was opened in its entirety.  Right tube and ovary were removed from fundus and placed within an endocatch bag.  The uterus and cervix were placed within specimen bag for morcellation because of increased size of uterus.  The endocatch bag was removed through the vagina, rupturing cyst in order for it to come out. Thankfully all was kept within bag.  Next, the specimen bag was fed through and out the vagina.  Cervix was sequentially grasped with prashanth thyroid clamp and tenaculum and pieces cored out in order to morcellate uterus.  Once the endometrium was reached, endometrial mass was well-circumscribed, but felt soft.  And so decision was made to send for frozen section pathology.  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  Once the entire uterus was morcellated and removed with bag intact, specimen was sent for permanent.  Vagina was irrigated.     Robot was again docked.  Cuff was closed with figure of eight 0 Vicryl suture at each angle and running 0 PDS Stratafix suture.   Care was taken to incorporate the vaginal mucosa and uterosacral ligaments.  Once the cuff was closed, the abdomen was reinspected laparoscopically and irrigated.  The vaginal cuff and all pedicles appeared to be very hemostatic.  When insufflation was brought down to 8 mmHg, hemostasis was still seen and so the decision was made to close.  All instruments were removed.  All instrument counts were correct.      All port sites were closed with 3-0 Vicryl in a subcuticular fashion and skin glue applied.  The Ramirez catheter was removed.  Patient tolerated the procedure well was taken to recovery room in stable condition.      Halina EPPS  MD Juan    04/02/25  11:17 EDT

## 2025-04-02 NOTE — ANESTHESIA PROCEDURE NOTES
Airway  Reason: elective    Date/Time: 4/2/2025 8:07 AM  Airway not difficult    General Information and Staff    Patient location during procedure: OR  CRNA/CAA: Cecilia Benites CRNA    Indications and Patient Condition  Indications for airway management: airway protection    Preoxygenated: yes  MILS not maintained throughout    Mask difficulty assessment: 1 - vent by mask    Final Airway Details    Final airway type: endotracheal airway      Successful airway: ETT  Cuffed: yes   Successful intubation technique: video laryngoscopy  Adjuncts used in placement: intubating stylet  Endotracheal tube insertion site: oral  Blade: Workman  Blade size: 3  ETT size (mm): 7.0  Cormack-Lehane Classification: grade I - full view of glottis  Placement verified by: chest auscultation and capnometry   Measured from: lips  ETT/EBT  to lips (cm): 21  Number of attempts at approach: 1  Assessment: lips, teeth, and gum same as pre-op and atraumatic intubation    Additional Comments  Negative epigastric sounds, Breath sound equal bilaterally with symmetric chest rise and fall

## 2025-04-02 NOTE — H&P
Pre-Op H&P  Siomara Duffy  [id number]  1980      Chief complaint: Abnormal Uterine Bleeding      Subjective:  Patient is a 45 y.o.female presents for scheduled surgery by Dr. Martinez. She anticipates a Robotic assisted hysterectomy with bilateral salpingectomy and possible right oopherectomy and possible cystectomy today.  The patient had been having abnormal uterine bleeding for the past couple of years.  Given her worsening symptoms, she was seen by gynecology in 2024.  Subsequent workup revealed an adnexal mass.    Review of Systems:  Constitutional-- No fever, chills or sweats. No fatigue.  CV-- No chest pain, palpitation or syncope  Resp-- No SOB, cough, hemoptysis  Skin--No rashes or lesions      Allergies:   Allergies   Allergen Reactions    Sulfa Antibiotics Rash         Home Meds:  Medications Prior to Admission   Medication Sig Dispense Refill Last Dose/Taking    cyclobenzaprine (FLEXERIL) 10 MG tablet Take 1 tablet by mouth 3 (Three) Times a Day As Needed for Muscle Spasms. 30 tablet 0     Specialty Vitamins Products (MENOPAUSE SUPPORT PO) Take 1 tablet by mouth Daily.            PMH:   Past Medical History:   Diagnosis Date    Anemia     Uterine mass     FIBROID    Wears glasses      PSH:    Past Surgical History:   Procedure Laterality Date    WISDOM TOOTH EXTRACTION         Immunization History:  Influenza: No  Pneumococcal: No  Tetanus: Yes  Covid : x3    Social History:   Tobacco:   Social History     Tobacco Use   Smoking Status Former    Types: Cigarettes    Start date:     Quit date:     Years since quittin.2   Smokeless Tobacco Never      Alcohol:     Social History     Substance and Sexual Activity   Alcohol Use Yes    Alcohol/week: 3.0 standard drinks of alcohol    Types: 3 Glasses of wine per week    Comment: couple glasses a week         Physical Exam:  BP: 136/84  HR: 81  RR: 17  SPO2: 99% on RA  Temp: 97.4      General Appearance:    Alert, cooperative,  no distress, appears stated age   Head:    Normocephalic, without obvious abnormality, atraumatic   Lungs:     Clear to auscultation bilaterally, respirations unlabored    Heart:   Regular rate and rhythm, S1 and S2 normal    Abdomen:    Soft without tenderness   Extremities:   Extremities normal, atraumatic, no cyanosis or edema   Skin:   Skin color, texture, turgor normal, no rashes or lesions   Neurologic:   Grossly intact     Results Review:     LABS:  Lab Results   Component Value Date    WBC 4.76 03/31/2025    HGB 7.5 (L) 03/31/2025    HCT 27.2 (L) 03/31/2025    MCV 88.3 03/31/2025     03/31/2025    NEUTROABS 2.82 03/31/2025    GLUCOSE 94 03/31/2025    BUN 8 03/31/2025    CREATININE 0.61 03/31/2025     03/31/2025    K 4.7 03/31/2025     03/31/2025    CO2 24.0 03/31/2025    CALCIUM 9.3 03/31/2025    ALBUMIN 4.2 12/02/2024    AST 12 12/02/2024    ALT 6 12/02/2024    BILITOT 0.3 12/02/2024       RADIOLOGY:  Imaging Results (Last 72 Hours)       ** No results found for the last 72 hours. **            I reviewed the patient's new clinical results.    Cancer Staging (if applicable)  Cancer Patient: __ yes _x_no __unknown; If yes, clinical stage T:__ N:__M:__, stage group or __N/A      Impression: DUB & Adnexal Mass      Plan: Robotic assisted hysterectomy with bilateral salpingectomy and possible right oopherectomy and possible cystectomy       Willian Henry, APRN   4/2/2025   07:21 EDT

## 2025-04-02 NOTE — PLAN OF CARE
Goal Outcome Evaluation:              Outcome Evaluation: VSS, ambulating, UOP adequate, abd binder apllied, IS teaching completed, fluids running, spouse at bedside,  no prn meds given awaiting possible DC

## 2025-04-02 NOTE — ANESTHESIA POSTPROCEDURE EVALUATION
Patient: Siomara Duffy    Procedure Summary       Date: 04/02/25 Room / Location:  [facility] [address] [place] [address] [facility]    Anesthesia Start: 0758 Anesthesia Stop: 1100    Procedures:       TOTAL LAPAROSCOPIC HYSTERECTOMY BILATERAL SALPINGECTOMY WITH DAVINCI ROBOT (Bilateral: Abdomen)      LAPAROSCOPIC OOPHORECTOMY WITH DAVINCI ROBOT (Right: Abdomen) Diagnosis:       Endometrial mass      Abnormal uterine bleeding (AUB)      Iron deficiency anemia due to chronic blood loss      Adnexal mass      (Endometrial mass [N94.89])      (Abnormal uterine bleeding (AUB) [N93.9])      (Iron deficiency anemia due to chronic blood loss [D50.0])      (Adnexal mass [N94.89])    Surgeons: Halina Martinez MD Provider: Michael Kenny MD    Anesthesia Type: general ASA Status: 2            Anesthesia Type: general    Vitals  No vitals data found for the desired time range.          Post Anesthesia Care and Evaluation    Patient location during evaluation: PACU  Patient participation: complete - patient cannot participate (sedated)  Pain score: 0  Pain management: adequate    Airway patency: patent  Anesthetic complications: No anesthetic complications  PONV Status: none  Cardiovascular status: acceptable, hemodynamically stable and stable  Respiratory status: acceptable, nasal cannula, spontaneous ventilation, oral airway and nonlabored ventilation  Hydration status: acceptable    Comments: BP: 116/62  RR:14  Temp:97.6  HR: 86  SpO2: 100%

## 2025-04-08 LAB
CYTO UR: NORMAL
LAB AP CASE REPORT: NORMAL
LAB AP CLINICAL INFORMATION: NORMAL
LAB AP DIAGNOSIS COMMENT: NORMAL
LAB AP SPECIAL STAINS: NORMAL
Lab: NORMAL
PATH REPORT.FINAL DX SPEC: NORMAL
PATH REPORT.GROSS SPEC: NORMAL

## 2025-04-16 ENCOUNTER — OFFICE VISIT (OUTPATIENT)
Dept: OBSTETRICS AND GYNECOLOGY | Facility: CLINIC | Age: 45
End: 2025-04-16
Payer: COMMERCIAL

## 2025-04-16 VITALS — WEIGHT: 169 LBS | BODY MASS INDEX: 29.01 KG/M2 | SYSTOLIC BLOOD PRESSURE: 120 MMHG | DIASTOLIC BLOOD PRESSURE: 78 MMHG

## 2025-04-16 DIAGNOSIS — N93.9 ABNORMAL UTERINE BLEEDING (AUB): ICD-10-CM

## 2025-04-16 DIAGNOSIS — D50.0 IRON DEFICIENCY ANEMIA DUE TO CHRONIC BLOOD LOSS: ICD-10-CM

## 2025-04-16 DIAGNOSIS — Z48.89 POSTOPERATIVE VISIT: ICD-10-CM

## 2025-04-16 DIAGNOSIS — Z34.82 MULTIGRAVIDA IN SECOND TRIMESTER: Primary | ICD-10-CM

## 2025-04-16 PROCEDURE — 99024 POSTOP FOLLOW-UP VISIT: CPT | Performed by: OBSTETRICS & GYNECOLOGY

## 2025-04-16 NOTE — PROGRESS NOTES
OBGYN Postoperative Exam Note          Subjective   Chief Complaint   Patient presents with    Post-op     Siomaraher PATRICIA Duffy is a 45 y.o. year old  presenting to be seen for her post-operative visit. She is S/P TLH and bilateral salpingectomy with davinci robot on 2025 at [facility] for  AUB, endometrial and adnexal mass, and iron deficiency anemia . Currently she reports no problems with eating, bowel movements, voiding, or wound drainage and pain is well controlled.    The pathology results from her procedure are in Siomara's record and are benign.      OTHER THINGS SHE WANTS TO DISCUSS TODAY:  Nothing else      Current Outpatient Medications:     ibuprofen (ADVIL,MOTRIN) 600 MG tablet, Take 1 tablet by mouth Every 6 (Six) Hours As Needed for Mild Pain or Moderate Pain (Take scheduled first 3 days)., Disp: 30 tablet, Rfl: 1    [Paused] Specialty Vitamins Products (MENOPAUSE SUPPORT PO), Take 1 tablet by mouth Daily., Disp: , Rfl:     acetaminophen (TYLENOL) 500 MG tablet, Take 2 tablets by mouth Every 8 (Eight) Hours As Needed for Moderate Pain or Mild Pain (Take scheduled first 3 days). (Patient not taking: Reported on 2025), Disp: 100 tablet, Rfl: 0    cyclobenzaprine (FLEXERIL) 10 MG tablet, Take 1 tablet by mouth 3 (Three) Times a Day As Needed for Muscle Spasms., Disp: 30 tablet, Rfl: 0    docusate sodium 100 MG capsule, Take 1 capsule by mouth 2 (Two) Times a Day. (Patient not taking: Reported on 2025), Disp: 60 each, Rfl: 2    gabapentin (NEURONTIN) 100 MG capsule, Take 1 capsule by mouth 3 (Three) Times a Day for 5 doses., Disp: 5 capsule, Rfl: 0     Past Medical History:   Diagnosis Date    Anemia     Uterine mass     FIBROID        Past Surgical History:   Procedure Laterality Date    OOPHORECTOMY Right 2025    Procedure: LAPAROSCOPIC OOPHORECTOMY WITH DAVINCI ROBOT;  Surgeon: Halina Martinez MD;  Location: [facility];  Service: Robotics - DaVinci;  Laterality:  Right;    TOTAL LAPAROSCOPIC HYSTERECTOMY SALPINGECTOMY Bilateral 4/2/2025    Procedure: TOTAL LAPAROSCOPIC HYSTERECTOMY BILATERAL SALPINGECTOMY WITH DAVINCI ROBOT;  Surgeon: Halina Martinez MD;  Location: [facility];  Service: Robotics - DaVinci;  Laterality: Bilateral;    WISDOM TOOTH EXTRACTION         The following portions of the patient's history were reviewed and updated as appropriate:current medications and allergies    Review of Systems  All other systems reviewed and negative     Objective   /78   Wt 76.7 kg (169 lb)   LMP 03/25/2025   BMI 29.01 kg/m²     Physical Exam  Physical Exam:  General:  well developed; well nourished  no acute distress  mentation appropriate   Abdomen: soft, non-tender; no masses  no umbilical or inguinal hernias are present  no hepato-splenomegaly  incisions are clean, dry, intact, and without drainage   Pelvis: Not performed.           Assessment   S/P TLH with right salpingo-oophorectomy, left salpingectomy     Plan   Nothing per vagina still until 8 weeks after her procedure.  May return to limited activity immediately with the following restrictions no heavy lifting  Pathology was reviewed with patient, Operative photos reviewed, and Any significant events that occurred during surgery reviewed  The importance of keeping all planned follow-up and taking all medications as prescribed was emphasized.  Return in about 4 weeks (around 5/14/2025) for FOR POSTOP VISIT.              Halina Martinez MD  04/16/2025     Answers submitted by the patient for this visit:  Post Operative Visit (Submitted on 4/14/2025)  Chief Complaint: Follow-up  Pain Control: well controlled  Fever: no fever  Diet: adequate intake  Activity: normal with assistance  Operative Site Issues: No

## 2025-05-14 ENCOUNTER — OFFICE VISIT (OUTPATIENT)
Dept: OBSTETRICS AND GYNECOLOGY | Facility: CLINIC | Age: 45
End: 2025-05-14
Payer: COMMERCIAL

## 2025-05-14 VITALS
DIASTOLIC BLOOD PRESSURE: 82 MMHG | WEIGHT: 173 LBS | BODY MASS INDEX: 29.53 KG/M2 | SYSTOLIC BLOOD PRESSURE: 140 MMHG | HEIGHT: 64 IN

## 2025-05-14 DIAGNOSIS — Z48.89 POSTOPERATIVE VISIT: Primary | ICD-10-CM

## 2025-05-14 PROCEDURE — 99024 POSTOP FOLLOW-UP VISIT: CPT | Performed by: OBSTETRICS & GYNECOLOGY

## 2025-05-14 NOTE — PROGRESS NOTES
OBGYN Postoperative Exam Note          Subjective   Chief Complaint   Patient presents with    Post-op     Siomara PATRICIA Duffy is a 45 y.o. year old  presenting to be seen for her post-operative visit. She is S/P TLH, bilateral salpingectomy with right oopherectomy on 2025 at [facility] for DUB and endometrial mass . Currently she reports no problems with eating, bowel movements, voiding, or wound drainage and pain is well controlled.    The results have previously been discussed with Siomara.    OTHER THINGS SHE WANTS TO DISCUSS TODAY:  Nothing else      Current Outpatient Medications:     acetaminophen (TYLENOL) 500 MG tablet, Take 2 tablets by mouth Every 8 (Eight) Hours As Needed for Moderate Pain or Mild Pain (Take scheduled first 3 days). (Patient not taking: Reported on 2025), Disp: 100 tablet, Rfl: 0    cyclobenzaprine (FLEXERIL) 10 MG tablet, Take 1 tablet by mouth 3 (Three) Times a Day As Needed for Muscle Spasms., Disp: 30 tablet, Rfl: 0    docusate sodium 100 MG capsule, Take 1 capsule by mouth 2 (Two) Times a Day. (Patient not taking: Reported on 2025), Disp: 60 each, Rfl: 2    gabapentin (NEURONTIN) 100 MG capsule, Take 1 capsule by mouth 3 (Three) Times a Day for 5 doses., Disp: 5 capsule, Rfl: 0    ibuprofen (ADVIL,MOTRIN) 600 MG tablet, Take 1 tablet by mouth Every 6 (Six) Hours As Needed for Mild Pain or Moderate Pain (Take scheduled first 3 days)., Disp: 30 tablet, Rfl: 1    [Paused] Specialty Vitamins Products (MENOPAUSE SUPPORT PO), Take 1 tablet by mouth Daily., Disp: , Rfl:      Past Medical History:   Diagnosis Date    Anemia     Migraine     Ovarian cyst 2018    PMS (premenstrual syndrome)     Rh incompatibility     Uterine mass     FIBROID    Varicella         Past Surgical History:   Procedure Laterality Date    D & C WITH SUCTION      OOPHORECTOMY Right 2025    Procedure: LAPAROSCOPIC OOPHORECTOMY WITH DAVINCI ROBOT;  Surgeon: Juan  "Halina Lofton MD;  Location:  [facility];  Service: Robotics - DaVinci;  Laterality: Right;    TOTAL LAPAROSCOPIC HYSTERECTOMY SALPINGECTOMY Bilateral 04/02/2025    Procedure: TOTAL LAPAROSCOPIC HYSTERECTOMY BILATERAL SALPINGECTOMY WITH DAVINCI ROBOT;  Surgeon: Halina Martinez MD;  Location:  [facility];  Service: Robotics - DaVinci;  Laterality: Bilateral;    WISDOM TOOTH EXTRACTION         The following portions of the patient's history were reviewed and updated as appropriate:current medications and allergies    Review of Systems   Constitutional: Negative.    HENT: Negative.     Eyes: Negative.    Respiratory: Negative.     Cardiovascular: Negative.    Gastrointestinal: Negative.    Endocrine: Negative.    Genitourinary: Negative.    Musculoskeletal: Negative.    Skin: Negative.    Allergic/Immunologic: Negative.    Neurological: Negative.    Hematological: Negative.    Psychiatric/Behavioral: Negative.            Objective   /82   Ht 162.6 cm (64\")   Wt 78.5 kg (173 lb)   LMP  (LMP Unknown)   BMI 29.70 kg/m²     Physical Exam    Physical Exam:  General:  well developed; well nourished  no acute distress  mentation appropriate   Abdomen: soft, non-tender; no masses  no umbilical or inguinal hernias are present  incisions are clean, dry, intact, and without drainage   Pelvis: Clinical staff was present for exam  External genitalia:  normal appearance of the external genitalia including Bartholin's and Plantation's glands.  :  urethral meatus normal; urethra normal:  Vaginal:  normal pink mucosa without prolapse or lesions. Vaginal cuff healing well           Assessment   S/P TLH with right salpingo-oophorectomy, left salpingectomy     Plan   Nothing per vagina still until 8-12 weeks after her procedure.  May return to full activity with no restrictions otherwise  The importance of keeping all planned follow-up and taking all medications as prescribed was emphasized.  Return in about 1 year (around " 5/14/2026) for Appropriate for followup with NP as desired., Annual physical.          Halina Martinez MD  05/14/2025     Answers submitted by the patient for this visit:  Post Operative Visit (Submitted on 5/12/2025)  Chief Complaint: Follow-up  Pain Control: not requiring pain medication  Fever: no fever  Diet: adequate intake  Activity: normal with assistance  Operative Site Issues: No

## (undated) DEVICE — SCRB SURG BACTOSHIELD CHG 4PCT 4OZ

## (undated) DEVICE — ANTIBACTERIAL UNDYED BRAIDED (POLYGLACTIN 910), SYNTHETIC ABSORBABLE SUTURE: Brand: COATED VICRYL

## (undated) DEVICE — TIP COVER ACCESSORY

## (undated) DEVICE — APPL CHLORAPREP TINTED 26ML TEAL

## (undated) DEVICE — TRENDELENBURG WINGPAD POSITIONING KIT DELUXE - WITHOUT BODY STRAP: Brand: SOULE MEDICAL

## (undated) DEVICE — LAPAROVUE VISIBILITY SYSTEM LAPAROSCOPIC SOLUTIONS: Brand: LAPAROVUE

## (undated) DEVICE — PK MAJ GYN DAVINCI 10

## (undated) DEVICE — MANIP UTER ELEVATOR/PRO W/OCCLUDORBALLOON/BALN 37MM LG STRL

## (undated) DEVICE — MICRO HVTSA, 0.5G AND HVTSA SOURCEMARK PRODUCT CODE M1206 AND M1206-01: Brand: EXOFIN MICRO HVTSA, 0.5G

## (undated) DEVICE — ANCHOR TISSUE RETRIEVAL SYSTEM, BAG SIZE 6000 ML, TRANSVAGINAL: Brand: ANCHOR TISSUE RETRIEVAL SYSTEM

## (undated) DEVICE — TISSUE RETRIEVAL SYSTEM: Brand: INZII RETRIEVAL SYSTEM

## (undated) DEVICE — ST TBG AIRSEAL BIF FLTR W/ACT/CHARCOAL/FLTR

## (undated) DEVICE — BLADELESS OBTURATOR: Brand: WECK VISTA

## (undated) DEVICE — TROC BLADLES AIRSEAL/OPTI THRD 8X120MM 1P/U

## (undated) DEVICE — ARM DRAPE

## (undated) DEVICE — COLUMN DRAPE

## (undated) DEVICE — SNAP KOVER: Brand: UNBRANDED

## (undated) DEVICE — SEAL

## (undated) DEVICE — UNDERGLV SURG BIOGEL INDICATOR LTX PF 7

## (undated) DEVICE — GLV SURG SENSICARE PI MIC PF SZ6.5 LF STRL

## (undated) DEVICE — BLANKT WARM UPPR/BDY ARM/OUT 57X196CM